# Patient Record
Sex: MALE | Race: WHITE | NOT HISPANIC OR LATINO
[De-identification: names, ages, dates, MRNs, and addresses within clinical notes are randomized per-mention and may not be internally consistent; named-entity substitution may affect disease eponyms.]

---

## 2018-03-05 PROBLEM — Z00.00 ENCOUNTER FOR PREVENTIVE HEALTH EXAMINATION: Status: ACTIVE | Noted: 2018-03-05

## 2018-05-16 ENCOUNTER — APPOINTMENT (OUTPATIENT)
Dept: DERMATOLOGY | Facility: CLINIC | Age: 58
End: 2018-05-16
Payer: COMMERCIAL

## 2018-05-16 PROCEDURE — 99202 OFFICE O/P NEW SF 15 MIN: CPT

## 2018-07-20 ENCOUNTER — APPOINTMENT (OUTPATIENT)
Dept: DERMATOLOGY | Facility: CLINIC | Age: 58
End: 2018-07-20

## 2018-08-19 ENCOUNTER — INPATIENT (INPATIENT)
Facility: HOSPITAL | Age: 58
LOS: 2 days | Discharge: HOME | DRG: 603 | End: 2018-08-22
Attending: FAMILY MEDICINE | Admitting: FAMILY MEDICINE
Payer: COMMERCIAL

## 2018-08-19 VITALS
DIASTOLIC BLOOD PRESSURE: 73 MMHG | HEART RATE: 89 BPM | SYSTOLIC BLOOD PRESSURE: 153 MMHG | OXYGEN SATURATION: 97 % | HEIGHT: 74 IN | WEIGHT: 233.03 LBS | RESPIRATION RATE: 18 BRPM | TEMPERATURE: 100 F

## 2018-08-19 DIAGNOSIS — L03.114 CELLULITIS OF LEFT UPPER LIMB: ICD-10-CM

## 2018-08-19 DIAGNOSIS — Z90.49 ACQUIRED ABSENCE OF OTHER SPECIFIED PARTS OF DIGESTIVE TRACT: Chronic | ICD-10-CM

## 2018-08-19 LAB
ALBUMIN SERPL ELPH-MCNC: 4.6 G/DL — SIGNIFICANT CHANGE UP (ref 3.3–5.2)
ALP SERPL-CCNC: 74 U/L — SIGNIFICANT CHANGE UP (ref 40–120)
ALT FLD-CCNC: 28 U/L — SIGNIFICANT CHANGE UP
ANION GAP SERPL CALC-SCNC: 15 MMOL/L — SIGNIFICANT CHANGE UP (ref 5–17)
APTT BLD: 30.7 SEC — SIGNIFICANT CHANGE UP (ref 27.5–37.4)
AST SERPL-CCNC: 22 U/L — SIGNIFICANT CHANGE UP
BASOPHILS # BLD AUTO: 0 K/UL — SIGNIFICANT CHANGE UP (ref 0–0.2)
BASOPHILS NFR BLD AUTO: 0.2 % — SIGNIFICANT CHANGE UP (ref 0–2)
BILIRUB SERPL-MCNC: 1.5 MG/DL — SIGNIFICANT CHANGE UP (ref 0.4–2)
BUN SERPL-MCNC: 9 MG/DL — SIGNIFICANT CHANGE UP (ref 8–20)
CALCIUM SERPL-MCNC: 9.7 MG/DL — SIGNIFICANT CHANGE UP (ref 8.6–10.2)
CHLORIDE SERPL-SCNC: 99 MMOL/L — SIGNIFICANT CHANGE UP (ref 98–107)
CO2 SERPL-SCNC: 25 MMOL/L — SIGNIFICANT CHANGE UP (ref 22–29)
CREAT SERPL-MCNC: 0.82 MG/DL — SIGNIFICANT CHANGE UP (ref 0.5–1.3)
EOSINOPHIL # BLD AUTO: 0.1 K/UL — SIGNIFICANT CHANGE UP (ref 0–0.5)
EOSINOPHIL NFR BLD AUTO: 1.2 % — SIGNIFICANT CHANGE UP (ref 0–5)
GLUCOSE SERPL-MCNC: 102 MG/DL — SIGNIFICANT CHANGE UP (ref 70–115)
GRAM STN FLD: SIGNIFICANT CHANGE UP
HCT VFR BLD CALC: 45.4 % — SIGNIFICANT CHANGE UP (ref 42–52)
HGB BLD-MCNC: 15.6 G/DL — SIGNIFICANT CHANGE UP (ref 14–18)
INR BLD: 1.23 RATIO — HIGH (ref 0.88–1.16)
LYMPHOCYTES # BLD AUTO: 19.9 % — LOW (ref 20–55)
LYMPHOCYTES # BLD AUTO: 2.2 K/UL — SIGNIFICANT CHANGE UP (ref 1–4.8)
MCHC RBC-ENTMCNC: 30.5 PG — SIGNIFICANT CHANGE UP (ref 27–31)
MCHC RBC-ENTMCNC: 34.4 G/DL — SIGNIFICANT CHANGE UP (ref 32–36)
MCV RBC AUTO: 88.8 FL — SIGNIFICANT CHANGE UP (ref 80–94)
MONOCYTES # BLD AUTO: 0.8 K/UL — SIGNIFICANT CHANGE UP (ref 0–0.8)
MONOCYTES NFR BLD AUTO: 7.4 % — SIGNIFICANT CHANGE UP (ref 3–10)
NEUTROPHILS # BLD AUTO: 7.9 K/UL — SIGNIFICANT CHANGE UP (ref 1.8–8)
NEUTROPHILS NFR BLD AUTO: 71 % — SIGNIFICANT CHANGE UP (ref 37–73)
PLATELET # BLD AUTO: 184 K/UL — SIGNIFICANT CHANGE UP (ref 150–400)
POTASSIUM SERPL-MCNC: 3.6 MMOL/L — SIGNIFICANT CHANGE UP (ref 3.5–5.3)
POTASSIUM SERPL-SCNC: 3.6 MMOL/L — SIGNIFICANT CHANGE UP (ref 3.5–5.3)
PROT SERPL-MCNC: 8 G/DL — SIGNIFICANT CHANGE UP (ref 6.6–8.7)
PROTHROM AB SERPL-ACNC: 13.6 SEC — HIGH (ref 9.8–12.7)
RBC # BLD: 5.11 M/UL — SIGNIFICANT CHANGE UP (ref 4.6–6.2)
RBC # FLD: 13.8 % — SIGNIFICANT CHANGE UP (ref 11–15.6)
SODIUM SERPL-SCNC: 139 MMOL/L — SIGNIFICANT CHANGE UP (ref 135–145)
SPECIMEN SOURCE: SIGNIFICANT CHANGE UP
WBC # BLD: 11.2 K/UL — HIGH (ref 4.8–10.8)
WBC # FLD AUTO: 11.2 K/UL — HIGH (ref 4.8–10.8)

## 2018-08-19 PROCEDURE — 99284 EMERGENCY DEPT VISIT MOD MDM: CPT

## 2018-08-19 PROCEDURE — 71045 X-RAY EXAM CHEST 1 VIEW: CPT | Mod: 26

## 2018-08-19 PROCEDURE — 99223 1ST HOSP IP/OBS HIGH 75: CPT

## 2018-08-19 PROCEDURE — 73130 X-RAY EXAM OF HAND: CPT | Mod: 26,LT

## 2018-08-19 RX ORDER — SACCHAROMYCES BOULARDII 250 MG
250 POWDER IN PACKET (EA) ORAL
Qty: 0 | Refills: 0 | Status: DISCONTINUED | OUTPATIENT
Start: 2018-08-19 | End: 2018-08-22

## 2018-08-19 RX ORDER — PIPERACILLIN AND TAZOBACTAM 4; .5 G/20ML; G/20ML
3.38 INJECTION, POWDER, LYOPHILIZED, FOR SOLUTION INTRAVENOUS EVERY 8 HOURS
Qty: 0 | Refills: 0 | Status: DISCONTINUED | OUTPATIENT
Start: 2018-08-19 | End: 2018-08-20

## 2018-08-19 RX ORDER — IBUPROFEN 200 MG
600 TABLET ORAL EVERY 6 HOURS
Qty: 0 | Refills: 0 | Status: DISCONTINUED | OUTPATIENT
Start: 2018-08-19 | End: 2018-08-22

## 2018-08-19 RX ORDER — PIPERACILLIN AND TAZOBACTAM 4; .5 G/20ML; G/20ML
3.38 INJECTION, POWDER, LYOPHILIZED, FOR SOLUTION INTRAVENOUS ONCE
Qty: 0 | Refills: 0 | Status: COMPLETED | OUTPATIENT
Start: 2018-08-19 | End: 2018-08-19

## 2018-08-19 RX ORDER — VANCOMYCIN HCL 1 G
1500 VIAL (EA) INTRAVENOUS EVERY 12 HOURS
Qty: 0 | Refills: 0 | Status: DISCONTINUED | OUTPATIENT
Start: 2018-08-20 | End: 2018-08-20

## 2018-08-19 RX ORDER — ENOXAPARIN SODIUM 100 MG/ML
40 INJECTION SUBCUTANEOUS DAILY
Qty: 0 | Refills: 0 | Status: DISCONTINUED | OUTPATIENT
Start: 2018-08-19 | End: 2018-08-22

## 2018-08-19 RX ORDER — TETANUS TOXOID, REDUCED DIPHTHERIA TOXOID AND ACELLULAR PERTUSSIS VACCINE, ADSORBED 5; 2.5; 8; 8; 2.5 [IU]/.5ML; [IU]/.5ML; UG/.5ML; UG/.5ML; UG/.5ML
0.5 SUSPENSION INTRAMUSCULAR ONCE
Qty: 0 | Refills: 0 | Status: COMPLETED | OUTPATIENT
Start: 2018-08-19 | End: 2018-08-19

## 2018-08-19 RX ORDER — IPRATROPIUM/ALBUTEROL SULFATE 18-103MCG
3 AEROSOL WITH ADAPTER (GRAM) INHALATION ONCE
Qty: 0 | Refills: 0 | Status: COMPLETED | OUTPATIENT
Start: 2018-08-19 | End: 2018-08-19

## 2018-08-19 RX ORDER — SODIUM CHLORIDE 9 MG/ML
3200 INJECTION INTRAMUSCULAR; INTRAVENOUS; SUBCUTANEOUS ONCE
Qty: 0 | Refills: 0 | Status: COMPLETED | OUTPATIENT
Start: 2018-08-19 | End: 2018-08-19

## 2018-08-19 RX ORDER — IBUPROFEN 200 MG
400 TABLET ORAL EVERY 6 HOURS
Qty: 0 | Refills: 0 | Status: DISCONTINUED | OUTPATIENT
Start: 2018-08-19 | End: 2018-08-22

## 2018-08-19 RX ORDER — ACETAMINOPHEN 500 MG
650 TABLET ORAL EVERY 6 HOURS
Qty: 0 | Refills: 0 | Status: DISCONTINUED | OUTPATIENT
Start: 2018-08-19 | End: 2018-08-22

## 2018-08-19 RX ORDER — IBUPROFEN 200 MG
600 TABLET ORAL ONCE
Qty: 0 | Refills: 0 | Status: COMPLETED | OUTPATIENT
Start: 2018-08-19 | End: 2018-08-19

## 2018-08-19 RX ORDER — VANCOMYCIN HCL 1 G
1000 VIAL (EA) INTRAVENOUS ONCE
Qty: 0 | Refills: 0 | Status: COMPLETED | OUTPATIENT
Start: 2018-08-19 | End: 2018-08-19

## 2018-08-19 RX ADMIN — PIPERACILLIN AND TAZOBACTAM 200 GRAM(S): 4; .5 INJECTION, POWDER, LYOPHILIZED, FOR SOLUTION INTRAVENOUS at 16:53

## 2018-08-19 RX ADMIN — SODIUM CHLORIDE 3200 MILLILITER(S): 9 INJECTION INTRAMUSCULAR; INTRAVENOUS; SUBCUTANEOUS at 16:53

## 2018-08-19 RX ADMIN — Medication 600 MILLIGRAM(S): at 16:55

## 2018-08-19 RX ADMIN — TETANUS TOXOID, REDUCED DIPHTHERIA TOXOID AND ACELLULAR PERTUSSIS VACCINE, ADSORBED 0.5 MILLILITER(S): 5; 2.5; 8; 8; 2.5 SUSPENSION INTRAMUSCULAR at 16:53

## 2018-08-19 RX ADMIN — Medication 3 MILLILITER(S): at 16:53

## 2018-08-19 RX ADMIN — Medication 250 MILLIGRAM(S): at 19:07

## 2018-08-19 RX ADMIN — Medication 3 MILLILITER(S): at 18:25

## 2018-08-19 NOTE — ED STATDOCS - MUSCULOSKELETAL, MLM
Able to move all extremities spontaneously. Able to move all extremities spontaneously. TTP of left 4th digit

## 2018-08-19 NOTE — H&P ADULT - NSHPLABSRESULTS_GEN_ALL_CORE
LABS:                        15.6   11.2  )-----------( 184      ( 19 Aug 2018 17:09 )             45.4     08-19    139  |  99  |  9.0  ----------------------------<  102  3.6   |  25.0  |  0.82    Ca    9.7      19 Aug 2018 17:09    TPro  8.0  /  Alb  4.6  /  TBili  1.5  /  DBili  x   /  AST  22  /  ALT  28  /  AlkPhos  74  08-19    PT/INR - ( 19 Aug 2018 17:09 )   PT: 13.6 sec;   INR: 1.23 ratio         PTT - ( 19 Aug 2018 17:09 )  PTT:30.7 sec

## 2018-08-19 NOTE — ED STATDOCS - SKIN, MLM
skin normal color for race, warm, dry and intact. obvious left 4th finger puncture wound with signs of infection

## 2018-08-19 NOTE — ED STATDOCS - MEDICAL DECISION MAKING DETAILS
59 y/o M s/p scratch from his cat, wheezing and rhonci concern for PNA will order Xray 57 y/o M s/p cat scratch with obvious left 4th finger infection, cellulitis concerning for tendon sheath flexor tenosynovitis, Xray, labs and most likely admit.

## 2018-08-19 NOTE — H&P ADULT - HISTORY OF PRESENT ILLNESS
58 years old male with no known PMH comes with left hand pain and swelling. As per patient, 4 days ago he accidently had puncture wound in his left 4th finger from cat's scratch while sleeping. Next day, he noticed some pain/swelling in his finger. He started using Hydrogen Peroxide and Epsom Salt but the pain and swelling are getting worse. He also has noticed redness around the finger for the last two days. Denies fever but was feeling warm today. No purulent discharge from wound but he expressed some serosanguinous fluid.

## 2018-08-19 NOTE — ED ADULT NURSE NOTE - OBJECTIVE STATEMENT
pt was clawed by cat to left 4th digit on thursday throughout the night,, swelling and redness to left 4th digit, now with streaking present up right forearm.

## 2018-08-19 NOTE — H&P ADULT - NSHPPHYSICALEXAM_GEN_ALL_CORE
Vital Signs Last 24 Hrs  T(C): 38 (19 Aug 2018 15:14), Max: 38 (19 Aug 2018 15:14)  T(F): 100.4 (19 Aug 2018 15:14), Max: 100.4 (19 Aug 2018 15:14)  HR: 60 (19 Aug 2018 19:07) (60 - 89)  BP: 116/63 (19 Aug 2018 19:07) (116/63 - 153/73)  RR: 18 (19 Aug 2018 15:14) (18 - 18)  SpO2: 97% (19 Aug 2018 15:14) (97% - 97%)  General: Well developed. Well nourished. No acute distress  HEENT: PERRLA. EOMI. Clear conjunctivae. Moist mucus membrane  Neck: Supple. No JVD. No Thyromegaly   Chest: CTA bilaterally - no wheezing, rales or rhonchi.   Heart: Normal S1 & S2 with RRR. No murmur.   Abdomen: Soft. Non-tender. Non-distended. + BS  Ext: No pedal edema. No calf tenderness. Left Hand: Erythema and swelling around 4th finger extending towards wrist - area marked. 2 puncture wounds in 4th finger - clean. Limited ROM.  Neuro: AAO x 3. No focal deficit. CN II-XII grossly WNL.  No speech disorder.  Skin: Warm and Dry  Psychiatry: Normal mood and affect

## 2018-08-19 NOTE — ED ADULT NURSE NOTE - NSIMPLEMENTINTERV_GEN_ALL_ED
Implemented All Universal Safety Interventions:  [place] to call system. Call bell, personal items and telephone within reach. Instruct patient to call for assistance. Room bathroom lighting operational. Non-slip footwear when patient is off stretcher. Physically safe environment: no spills, clutter or unnecessary equipment. Stretcher in lowest position, wheels locked, appropriate side rails in place.

## 2018-08-19 NOTE — ED STATDOCS - ATTENDING CONTRIBUTION TO CARE
The patient seen and examined.  The patient presents with streaking cellulitis after car scratch.  Cellulitis    I, Juvenal Early, performed the initial face to face bedside interview with this patient regarding history of present illness, review of symptoms and relevant past medical, social and family history.  I completed an independent physical examination.  I was the initial provider who evaluated this patient. I have signed out the follow up of any pending tests (i.e. labs, radiological studies) to the ACP.  I have communicated the patient’s plan of care and disposition with the ACP.  The history, relevant review of systems, past medical and surgical history, medical decision making, and physical examination was documented by the scribe in my presence and I attest to the accuracy of the documentation.

## 2018-08-19 NOTE — H&P ADULT - FAMILY HISTORY
Father  Still living? Unknown  Family history of hypertension, Age at diagnosis: Age Unknown  Family history of lung cancer, Age at diagnosis: Age Unknown

## 2018-08-19 NOTE — CONSULT NOTE ADULT - SUBJECTIVE AND OBJECTIVE BOX
Pt Name: KRISHNA HENRY    MRN: [id number]      Patient is a 58y Male presenting to the emergency department with a chief complaint of left 4th finger pain, swelling and decreased range of motion over the last several days after being scratched by his cat. He stated he has been soaking his finger in epsom salts and expressed some serosanguinous fluid from the abrasion. Denies any other accident of injuries.     HEALTH ISSUES - PROBLEM Dx:      .      REVIEW OF SYSTEMS      General:	comfortable , no acute distress    Skin/Breast: Left hand 4 th finger cellulitis travelling up forearm , erythema noted   	  Respiratory and Thorax: Currently having nebulizer treatment for wheezing and pneumonia  	  Musculoskeletal:  	Left hand: 4th digit cellulitis with limited range of motion secondary to swelling and pain    Allergic/Immunologic:	none     ROS is otherwise negative.    PAST MEDICAL & SURGICAL HISTORY:  PAST MEDICAL & SURGICAL HISTORY:  Pneumonia  History of appendectomy      Allergies: No Known Allergies      Medications: ALBUTerol/ipratropium for Nebulization. 3 milliLiter(s) Nebulizer once  vancomycin  IVPB 1000 milliGRAM(s) IV Intermittent once      FAMILY HISTORY:  : non-contributory    Social History:     Ambulation: Walking independently                           15.6   11.2  )-----------( 184      ( 19 Aug 2018 17:09 )             45.4     08-19    139  |  99  |  9.0  ----------------------------<  102  3.6   |  25.0  |  0.82    Ca    9.7      19 Aug 2018 17:09    TPro  8.0  /  Alb  4.6  /  TBili  1.5  /  DBili  x   /  AST  22  /  ALT  28  /  AlkPhos  74  08-19      PHYSICAL EXAM:    Vital Signs Last 24 Hrs  T(C): 38 (19 Aug 2018 15:14), Max: 38 (19 Aug 2018 15:14)  T(F): 100.4 (19 Aug 2018 15:14), Max: 100.4 (19 Aug 2018 15:14)  HR: 89 (19 Aug 2018 15:14) (89 - 89)  BP: 153/73 (19 Aug 2018 15:14) (153/73 - 153/73)  BP(mean): --  RR: 18 (19 Aug 2018 15:14) (18 - 18)  SpO2: 97% (19 Aug 2018 15:14) (97% - 97%)  Daily Height in cm: 187.96 (19 Aug 2018 15:14)    Daily     Appearance: Alert, responsive, in no acute distress.    Neurological: Sensation is grossly intact to light touch. limited  motor function secondary to pain and swelling.   No focal deficits or weaknesses found.    Skin: cellulitis travelling up forearm dorsal aspect. 2 small areas of skin abrasion noted on ulnar and radial aspect of4 th finger Vascular: 2+ distal pulses. Cap refill < 2 sec. No signs of venous insuffiencey or stasis. No extremity ulcerations. No cyanosis.    Musculoskeletal:         Left Upper Extremity: pain and swelling 4 th finger LUE secondary to a cat scratch several days ago. Limited range of motion of 4 th finger secondary to pain and swelling.  Cellulitis noted at dorsal aspect of forearm  and into the 4th  webspace: There is erythema of the affected area.. There is mild tenderness of the affected area. No fluctuance. No drainage or discharge  Compartments are soft. Sensation to light touch is grossly intact without focal deficit and is symmetric bilaterally. Motion is mildly limited as a result of pain. 2+ radial pulse. Capillary refill is less than 2 seconds. No cyanosis..    Primary Orthopedic Assessment:  • hand cellulitis 4th finger         Right Upper Extremity:       Left Lower Extremity:       Right Lower Extremity:    Imaging Studies:  X rays pending, Have not been obtained yet     A/P:  Discussed with Dr Tellez. He will be treated non operatively with warm soaks and IV antibiotics  Infectious disease consult  Admiit to medicine for IVABX    T(C): 38 (19 Aug 2018 15:14), Max: 38 (19 Aug 2018 15:14)  T(F): 100.4 (19 Aug 2018 15:14), Max: 100.4 (19 Aug 2018 15:14)  HR: 89 (19 Aug 2018 15:14) (89 - 89)  BP: 153/73 (19 Aug 2018 15:14) (153/73 - 153/73)  BP(mean): --  RR: 18 (19 Aug 2018 15:14) (18 - 18)  SpO2: 97% (19 Aug 2018 15:14) (97% - 97%)                          15.6   11.2  )-----------( 184      ( 19 Aug 2018 17:09 )             45.4     08-19    139  |  99  |  9.0  ----------------------------<  102  3.6   |  25.0  |  0.82    Ca    9.7      19 Aug 2018 17:09    TPro  8.0  /  Alb  4.6  /  TBili  1.5  /  DBili  x   /  AST  22  /  ALT  28  /  AlkPhos  74  08-19      MEDICATIONS  (STANDING):  ALBUTerol/ipratropium for Nebulization. 3 milliLiter(s) Nebulizer once  vancomycin  IVPB 1000 milliGRAM(s) IV Intermittent once Pt Name: KRISHNA HENRY    MRN: [id number]      Patient is a 58y Right hand dominant (also ambidextrous ) Male presenting to the emergency department with a chief complaint of left 4th finger pain, swelling and decreased range of motion over the last several days after being scratched by his cat. He stated he has been soaking his finger in epsom salts and expressed some serosanguinous fluid from the abrasion. Denies any other accident of injuries. He has not seen anyone else at this time and not taken any antibiotics.     REVIEW OF SYSTEMS      General:	comfortable , no acute distress    Skin/Breast: Left hand 4 th finger cellulitis travelling up forearm , erythema noted   	  Respiratory and Thorax: Currently having nebulizer treatment for wheezing and pneumonia  	  Musculoskeletal:  	Left hand: 4th digit cellulitis with limited range of motion secondary to swelling and pain    Allergic/Immunologic:	none     ROS is otherwise negative.    PAST MEDICAL & SURGICAL HISTORY:  PAST MEDICAL & SURGICAL HISTORY:  Pneumonia - currently  History of appendectomy  Right long finger surgery  undescended testicle surgery    Allergies: No Known Allergies      Medications: ALBUTerol/ipratropium for Nebulization. 3 milliLiter(s) Nebulizer once  vancomycin  IVPB 1000 milliGRAM(s) IV Intermittent once      FAMILY HISTORY:  : non-contributory    Social History: nonsmoker    Ambulation: Walking independently                           15.6   11.2  )-----------( 184      ( 19 Aug 2018 17:09 )             45.4     08-19    139  |  99  |  9.0  ----------------------------<  102  3.6   |  25.0  |  0.82    Ca    9.7      19 Aug 2018 17:09    TPro  8.0  /  Alb  4.6  /  TBili  1.5  /  DBili  x   /  AST  22  /  ALT  28  /  AlkPhos  74  08-19      PHYSICAL EXAM:    Vital Signs Last 24 Hrs  T(C): 38 (19 Aug 2018 15:14), Max: 38 (19 Aug 2018 15:14)  T(F): 100.4 (19 Aug 2018 15:14), Max: 100.4 (19 Aug 2018 15:14)  HR: 89 (19 Aug 2018 15:14) (89 - 89)  BP: 153/73 (19 Aug 2018 15:14) (153/73 - 153/73)  BP(mean): --  RR: 18 (19 Aug 2018 15:14) (18 - 18)  SpO2: 97% (19 Aug 2018 15:14) (97% - 97%)  Daily Height in cm: 187.96 (19 Aug 2018 15:14)    Daily     Appearance: Alert, responsive, in no acute distress.    Neurological: Sensation is grossly intact to light touch. limited  motor function secondary to pain and swelling.   No focal deficits or weaknesses found.    Skin: cellulitis travelling up forearm dorsal aspect. 2 small areas of skin abrasion noted on ulnar and radial aspect of4 th finger Vascular: 2+ distal pulses. Cap refill < 2 sec. No signs of venous insuffiencey or stasis. No extremity ulcerations. No cyanosis.    Musculoskeletal:         Left Upper Extremity: pain and swelling 4 th finger LUE secondary to a cat scratch several days ago. Limited range of motion of 4 th finger secondary to pain and swelling.  Cellulitis noted at dorsal aspect of forearm and into the 4th  webspace: There is erythema of the affected area.. There is mild tenderness of the affected area. No fluctuance. No drainage or discharge. small superficial skin clearing radial side of ring finger - unroofed and culture taken. Compartments are soft. Sensation to light touch is grossly intact without focal deficit and is symmetric bilaterally. Motion is mildly limited as a result of pain. 2+ radial pulse. Capillary refill is less than 2 seconds. No cyanosis.. No flexor sheath kanavel signs present;    Primary Orthopedic Assessment:  • hand cellulitis/lumphangitis 4th finger secondary to cat scratch         Right Upper Extremity: FROM, no tenderness, no swelling, NV intact      Imaging Studies:  X rays my interpretation (Renea Tellez MD ) left hand - soft tissue swelling ring finger    A/P:  Cellulitis/lymphangitis left ring finger/hand     -Patient seen with Dr Tellez.   - Infectious disease consult  - Admiit to medicine for IVABX  -elevation  -warm compresses  -warm soaks  -no need for I&D at this time

## 2018-08-19 NOTE — ED STATDOCS - PROGRESS NOTE DETAILS
JOSE M GONCALVES: PT evaluated by intake physician. HPI/PE/ROS as noted above. Will follow up plan per intake physician HAND CALLED FOR CONSULT

## 2018-08-19 NOTE — ED STATDOCS - OBJECTIVE STATEMENT
59 y/o M pt with hx of PNA presents to ED c/o of a cat scratch/puncture with its claws on his left hand after rolling over her while asleep on Thursday night. Subjective temperature of over 100 degrees F and increase in swelling of the L hand from the puncture and erythema radiating toward the wrist. 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 Pt has been soaking scratch in hydrogen pre oxide and epsin salts.  Associated cough and wheezing. Last tetanus shot > 7 years ago. Denies N/V/D, chills, SOB, CP, difficulty breathing, HA, blurry vision or abd pain. 57 y/o M pt with hx of PNA presents to ED c/o of a cat scratch/puncture with its claws on his left 4th digit after rolling over her while asleep on Thursday night. Subjective temperature of over 100 degrees F and increase in swelling of the L hand from the puncture and erythema radiating toward the wrist. 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 Pt has been soaking scratch in hydrogen pre oxide and epsom salt. Associated cough and wheezing. Last tetanus shot > 7 years ago. Denies N/V/D, chills, SOB, CP, difficulty breathing, HA, blurry vision or abd pain.

## 2018-08-19 NOTE — H&P ADULT - ASSESSMENT
58 years old male with no known PMH comes with left hand pain and swelling. As per patient, 4 days ago he accidently had puncture wound in his left 4th finger from cat's scratch while sleeping. Next day, he noticed some pain/swelling in his finger. He started using Hydrogen Peroxide and Epsom Salt but the pain and swelling are getting worse. He also has noticed redness around the finger for the last two days. Denies fever but was feeling warm today. No purulent discharge from wound but he expressed some serosanguinous fluid. 58 years old male with no known PMH comes with left hand pain and swelling. As per patient, 4 days ago he accidently had puncture wound in his left 4th finger from cat's scratch while sleeping. Next day, he noticed some pain/swelling in his finger. He started using Hydrogen Peroxide and Epsom Salt but the pain and swelling are getting worse. He also has noticed redness around the finger for the last two days. Denies fever but was feeling warm today. No purulent discharge from wound but he expressed some serosanguinous fluid.      -- Left Hand Cellulitis  - Cultures sent  - ADAcel given  - Ortho Consult appreciated  - Continue Vanco and Zosyn  - Warm compresses  - ID Consult  DVT Prophylaxis -- Lovenox 40 mg

## 2018-08-20 DIAGNOSIS — L03.114 CELLULITIS OF LEFT UPPER LIMB: ICD-10-CM

## 2018-08-20 LAB
-  CANDIDA ALBICANS: SIGNIFICANT CHANGE UP
-  CANDIDA GLABRATA: SIGNIFICANT CHANGE UP
-  CANDIDA KRUSEI: SIGNIFICANT CHANGE UP
-  CANDIDA PARAPSILOSIS: SIGNIFICANT CHANGE UP
-  CANDIDA TROPICALIS: SIGNIFICANT CHANGE UP
-  COAGULASE NEGATIVE STAPHYLOCOCCUS: SIGNIFICANT CHANGE UP
-  K. PNEUMONIAE GROUP: SIGNIFICANT CHANGE UP
-  KPC RESISTANCE GENE: SIGNIFICANT CHANGE UP
-  STREPTOCOCCUS SP. (NOT GRP A, B OR S PNEUMONIAE): SIGNIFICANT CHANGE UP
A BAUMANNII DNA SPEC QL NAA+PROBE: SIGNIFICANT CHANGE UP
ANION GAP SERPL CALC-SCNC: 14 MMOL/L — SIGNIFICANT CHANGE UP (ref 5–17)
APPEARANCE UR: CLEAR — SIGNIFICANT CHANGE UP
BASOPHILS # BLD AUTO: 0 K/UL — SIGNIFICANT CHANGE UP (ref 0–0.2)
BASOPHILS NFR BLD AUTO: 0.2 % — SIGNIFICANT CHANGE UP (ref 0–2)
BILIRUB UR-MCNC: NEGATIVE — SIGNIFICANT CHANGE UP
BUN SERPL-MCNC: 9 MG/DL — SIGNIFICANT CHANGE UP (ref 8–20)
CALCIUM SERPL-MCNC: 8.8 MG/DL — SIGNIFICANT CHANGE UP (ref 8.6–10.2)
CHLORIDE SERPL-SCNC: 100 MMOL/L — SIGNIFICANT CHANGE UP (ref 98–107)
CO2 SERPL-SCNC: 23 MMOL/L — SIGNIFICANT CHANGE UP (ref 22–29)
COLOR SPEC: YELLOW — SIGNIFICANT CHANGE UP
CREAT SERPL-MCNC: 0.8 MG/DL — SIGNIFICANT CHANGE UP (ref 0.5–1.3)
DIFF PNL FLD: NEGATIVE — SIGNIFICANT CHANGE UP
E CLOAC COMP DNA BLD POS QL NAA+PROBE: SIGNIFICANT CHANGE UP
E COLI DNA BLD POS QL NAA+NON-PROBE: SIGNIFICANT CHANGE UP
ENTEROCOC DNA BLD POS QL NAA+NON-PROBE: SIGNIFICANT CHANGE UP
ENTEROCOC DNA BLD POS QL NAA+NON-PROBE: SIGNIFICANT CHANGE UP
EOSINOPHIL # BLD AUTO: 0.1 K/UL — SIGNIFICANT CHANGE UP (ref 0–0.5)
EOSINOPHIL NFR BLD AUTO: 1.2 % — SIGNIFICANT CHANGE UP (ref 0–5)
GLUCOSE SERPL-MCNC: 144 MG/DL — HIGH (ref 70–115)
GLUCOSE UR QL: NEGATIVE MG/DL — SIGNIFICANT CHANGE UP
GP B STREP DNA BLD POS QL NAA+NON-PROBE: SIGNIFICANT CHANGE UP
HAEM INFLU DNA BLD POS QL NAA+NON-PROBE: SIGNIFICANT CHANGE UP
HCT VFR BLD CALC: 40.8 % — LOW (ref 42–52)
HGB BLD-MCNC: 13.7 G/DL — LOW (ref 14–18)
K OXYTOCA DNA BLD POS QL NAA+NON-PROBE: SIGNIFICANT CHANGE UP
KETONES UR-MCNC: NEGATIVE — SIGNIFICANT CHANGE UP
L MONOCYTOG DNA BLD POS QL NAA+NON-PROBE: SIGNIFICANT CHANGE UP
LEUKOCYTE ESTERASE UR-ACNC: NEGATIVE — SIGNIFICANT CHANGE UP
LYMPHOCYTES # BLD AUTO: 2.4 K/UL — SIGNIFICANT CHANGE UP (ref 1–4.8)
LYMPHOCYTES # BLD AUTO: 22.5 % — SIGNIFICANT CHANGE UP (ref 20–55)
MAGNESIUM SERPL-MCNC: 1.9 MG/DL — SIGNIFICANT CHANGE UP (ref 1.6–2.6)
MCHC RBC-ENTMCNC: 29.9 PG — SIGNIFICANT CHANGE UP (ref 27–31)
MCHC RBC-ENTMCNC: 33.6 G/DL — SIGNIFICANT CHANGE UP (ref 32–36)
MCV RBC AUTO: 89.1 FL — SIGNIFICANT CHANGE UP (ref 80–94)
METHOD TYPE: SIGNIFICANT CHANGE UP
MONOCYTES # BLD AUTO: 0.9 K/UL — HIGH (ref 0–0.8)
MONOCYTES NFR BLD AUTO: 8 % — SIGNIFICANT CHANGE UP (ref 3–10)
MRSA SPEC QL CULT: SIGNIFICANT CHANGE UP
MSSA DNA SPEC QL NAA+PROBE: SIGNIFICANT CHANGE UP
N MEN ISLT CULT: SIGNIFICANT CHANGE UP
NEUTROPHILS # BLD AUTO: 7.2 K/UL — SIGNIFICANT CHANGE UP (ref 1.8–8)
NEUTROPHILS NFR BLD AUTO: 67.8 % — SIGNIFICANT CHANGE UP (ref 37–73)
NITRITE UR-MCNC: NEGATIVE — SIGNIFICANT CHANGE UP
P AERUGINOSA DNA BLD POS NAA+NON-PROBE: SIGNIFICANT CHANGE UP
PH UR: 6.5 — SIGNIFICANT CHANGE UP (ref 5–8)
PLATELET # BLD AUTO: 164 K/UL — SIGNIFICANT CHANGE UP (ref 150–400)
POTASSIUM SERPL-MCNC: 3.5 MMOL/L — SIGNIFICANT CHANGE UP (ref 3.5–5.3)
POTASSIUM SERPL-SCNC: 3.5 MMOL/L — SIGNIFICANT CHANGE UP (ref 3.5–5.3)
PROT UR-MCNC: NEGATIVE MG/DL — SIGNIFICANT CHANGE UP
RBC # BLD: 4.58 M/UL — LOW (ref 4.6–6.2)
RBC # FLD: 14 % — SIGNIFICANT CHANGE UP (ref 11–15.6)
S MARCESCENS DNA BLD POS NAA+NON-PROBE: SIGNIFICANT CHANGE UP
S PNEUM DNA BLD POS QL NAA+NON-PROBE: SIGNIFICANT CHANGE UP
S PYO DNA BLD POS QL NAA+NON-PROBE: SIGNIFICANT CHANGE UP
SODIUM SERPL-SCNC: 137 MMOL/L — SIGNIFICANT CHANGE UP (ref 135–145)
SP GR SPEC: 1.01 — SIGNIFICANT CHANGE UP (ref 1.01–1.02)
UROBILINOGEN FLD QL: 1 MG/DL
WBC # BLD: 10.7 K/UL — SIGNIFICANT CHANGE UP (ref 4.8–10.8)
WBC # FLD AUTO: 10.7 K/UL — SIGNIFICANT CHANGE UP (ref 4.8–10.8)

## 2018-08-20 PROCEDURE — 93010 ELECTROCARDIOGRAM REPORT: CPT

## 2018-08-20 PROCEDURE — 99223 1ST HOSP IP/OBS HIGH 75: CPT

## 2018-08-20 PROCEDURE — 99233 SBSQ HOSP IP/OBS HIGH 50: CPT | Mod: GC

## 2018-08-20 RX ORDER — AMPICILLIN SODIUM AND SULBACTAM SODIUM 250; 125 MG/ML; MG/ML
INJECTION, POWDER, FOR SUSPENSION INTRAMUSCULAR; INTRAVENOUS
Qty: 0 | Refills: 0 | Status: DISCONTINUED | OUTPATIENT
Start: 2018-08-20 | End: 2018-08-22

## 2018-08-20 RX ORDER — AMPICILLIN SODIUM AND SULBACTAM SODIUM 250; 125 MG/ML; MG/ML
3 INJECTION, POWDER, FOR SUSPENSION INTRAMUSCULAR; INTRAVENOUS ONCE
Qty: 0 | Refills: 0 | Status: COMPLETED | OUTPATIENT
Start: 2018-08-20 | End: 2018-08-20

## 2018-08-20 RX ORDER — VANCOMYCIN HCL 1 G
1500 VIAL (EA) INTRAVENOUS EVERY 8 HOURS
Qty: 0 | Refills: 0 | Status: DISCONTINUED | OUTPATIENT
Start: 2018-08-20 | End: 2018-08-20

## 2018-08-20 RX ORDER — AMPICILLIN SODIUM AND SULBACTAM SODIUM 250; 125 MG/ML; MG/ML
3 INJECTION, POWDER, FOR SUSPENSION INTRAMUSCULAR; INTRAVENOUS EVERY 6 HOURS
Qty: 0 | Refills: 0 | Status: DISCONTINUED | OUTPATIENT
Start: 2018-08-20 | End: 2018-08-22

## 2018-08-20 RX ADMIN — Medication 1 TABLET(S): at 09:15

## 2018-08-20 RX ADMIN — Medication 600 MILLIGRAM(S): at 16:05

## 2018-08-20 RX ADMIN — Medication 1 TABLET(S): at 12:01

## 2018-08-20 RX ADMIN — AMPICILLIN SODIUM AND SULBACTAM SODIUM 200 GRAM(S): 250; 125 INJECTION, POWDER, FOR SUSPENSION INTRAMUSCULAR; INTRAVENOUS at 17:19

## 2018-08-20 RX ADMIN — Medication 1 TABLET(S): at 08:45

## 2018-08-20 RX ADMIN — Medication 250 MILLIGRAM(S): at 17:19

## 2018-08-20 RX ADMIN — Medication 1 TABLET(S): at 16:35

## 2018-08-20 RX ADMIN — Medication 300 MILLIGRAM(S): at 05:11

## 2018-08-20 RX ADMIN — ENOXAPARIN SODIUM 40 MILLIGRAM(S): 100 INJECTION SUBCUTANEOUS at 12:01

## 2018-08-20 RX ADMIN — Medication 1 TABLET(S): at 16:05

## 2018-08-20 RX ADMIN — PIPERACILLIN AND TAZOBACTAM 25 GRAM(S): 4; .5 INJECTION, POWDER, LYOPHILIZED, FOR SOLUTION INTRAVENOUS at 09:00

## 2018-08-20 RX ADMIN — Medication 600 MILLIGRAM(S): at 16:35

## 2018-08-20 RX ADMIN — AMPICILLIN SODIUM AND SULBACTAM SODIUM 200 GRAM(S): 250; 125 INJECTION, POWDER, FOR SUSPENSION INTRAMUSCULAR; INTRAVENOUS at 12:01

## 2018-08-20 RX ADMIN — PIPERACILLIN AND TAZOBACTAM 25 GRAM(S): 4; .5 INJECTION, POWDER, LYOPHILIZED, FOR SOLUTION INTRAVENOUS at 01:14

## 2018-08-20 NOTE — PROGRESS NOTE ADULT - PROBLEM SELECTOR PLAN 1
due to cat bite - requires Pasturella multiocida coverage - agreed with change to IV unasyn and if clinically improves will change to po augmentin.  + Bacteremia with GNR - Pending I%S  - pain control

## 2018-08-20 NOTE — PROGRESS NOTE ADULT - SUBJECTIVE AND OBJECTIVE BOX
CC: Left hand pain and swelling (19 Aug 2018 19:24)    HPI: 58 years old male with no known PMH with left hand pain and swelling due to recent cat bite - started to improve with IV abx.     INTERVAL HPI/OVERNIGHT EVENTS: decreased edema and pain, + HA, + chest congestion and dry cough for 1 week  Other ROS reviewed and neg     Vital Signs Last 24 Hrs  T(C): 37.4 (20 Aug 2018 12:32), Max: 37.4 (20 Aug 2018 12:32)  T(F): 99.4 (20 Aug 2018 12:32), Max: 99.4 (20 Aug 2018 12:32)  HR: 78 (20 Aug 2018 12:32) (60 - 78)  BP: 117/77 (20 Aug 2018 12:32) (116/63 - 122/79)  RR: 18 (20 Aug 2018 12:32) (16 - 18)  SpO2: 96% (20 Aug 2018 12:32) (96% - 97%)  I&O's Detail    20 Aug 2018 07:01  -  20 Aug 2018 15:38  --------------------------------------------------------  IN:    IV PiggyBack: 200 mL  Total IN: 200 mL    OUT:  Total OUT: 0 mL    Total NET: 200 mL                            13.7   10.7  )-----------( 164      ( 20 Aug 2018 06:47 )             40.8     20 Aug 2018 06:47    137    |  100    |  9.0    ----------------------------<  144    3.5     |  23.0   |  0.80     Ca    8.8        20 Aug 2018 06:47  Mg     1.9       20 Aug 2018 06:47    TPro  8.0    /  Alb  4.6    /  TBili  1.5    /  DBili  x      /  AST  22     /  ALT  28     /  AlkPhos  74     19 Aug 2018 17:09    PT/INR - ( 19 Aug 2018 17:09 )   PT: 13.6 sec;   INR: 1.23 ratio         PTT - ( 19 Aug 2018 17:09 )  PTT:30.7 sec    LIVER FUNCTIONS - ( 19 Aug 2018 17:09 )  Alb: 4.6 g/dL / Pro: 8.0 g/dL / ALK PHOS: 74 U/L / ALT: 28 U/L / AST: 22 U/L / GGT: x           Urinalysis Basic - ( 20 Aug 2018 03:06 )    Color: Yellow / Appearance: Clear / S.015 / pH: x  Gluc: x / Ketone: Negative  / Bili: Negative / Urobili: 1 mg/dL   Blood: x / Protein: Negative mg/dL / Nitrite: Negative   Leuk Esterase: Negative / RBC: x / WBC x   Sq Epi: x / Non Sq Epi: x / Bacteria: x    MEDICATIONS  (STANDING):  ampicillin/sulbactam  IVPB      ampicillin/sulbactam  IVPB 3 Gram(s) IV Intermittent every 6 hours  enoxaparin Injectable 40 milliGRAM(s) SubCutaneous daily  multivitamin 1 Tablet(s) Oral daily  saccharomyces boulardii 250 milliGRAM(s) Oral two times a day    MEDICATIONS  (PRN):  acetaminophen   Tablet 650 milliGRAM(s) Oral every 6 hours PRN For Temp greater than 38 C (100.4 F)  acetaminophen   Tablet. 650 milliGRAM(s) Oral every 6 hours PRN Mild Pain (1 - 3)  acetaminophen 325 mG/butalbital 50 mG/caffeine 40 mG 1 Tablet(s) Oral every 4 hours PRN headache  ibuprofen  Tablet 400 milliGRAM(s) Oral every 6 hours PRN Moderate Pain  ibuprofen  Tablet 600 milliGRAM(s) Oral every 6 hours PRN Severe Pain    RADIOLOGY & ADDITIONAL TESTS: personally visualized    PHYSICAL EXAM:    General: Well developed; well nourished; in no acute distress  Eyes: PERRLA, EOMI; conjunctiva and sclera clear  Head: Normocephalic; atraumatic  ENMT: No nasal discharge; airway clear  Neck: Supple; non tender; no masses  Respiratory: No wheezes, rales or rhonchi  Cardiovascular: Regular rate and rhythm. S1 and S2   Gastrointestinal: Soft non-tender non-distended; Normal bowel sounds  Genitourinary: No costovertebral angle tenderness  Extremities: Normal range of motion, No clubbing, cyanosis or edema  Vascular: Peripheral pulses palpable 2+ bilaterally  Neurological: Alert and oriented x4  Skin: Warm and dry. left UE demarcated area of erythema and edema involving middle finger with signs of bite, no striking  Musculoskeletal: Normal gait, tone, without deformities  Psychiatric: Cooperative and appropriate

## 2018-08-20 NOTE — PROGRESS NOTE ADULT - ASSESSMENT
58 years old male with no known PMH with left hand pain and swelling due to recent cat bite - started to improve with IV abx

## 2018-08-20 NOTE — PROGRESS NOTE ADULT - SUBJECTIVE AND OBJECTIVE BOX
PA - Note    Patient being followed for a Left 4th finger cellulitis  Patient was found laying on stretcher in the ED.  Presently, he is comfortable and his pain is controlled.  States that he is felling a little better this morning.  No further complaints    Vital Signs Last 24 Hrs  T(C): 37.4 (20 Aug 2018 12:32), Max: 38 (19 Aug 2018 15:14)  T(F): 99.4 (20 Aug 2018 12:32), Max: 100.4 (19 Aug 2018 15:14)  HR: 78 (20 Aug 2018 12:32) (60 - 89)  BP: 117/77 (20 Aug 2018 12:32) (116/63 - 153/73)  BP(mean): --  RR: 18 (20 Aug 2018 12:32) (16 - 18)  SpO2: 96% (20 Aug 2018 12:32) (96% - 97%)    Left Upper Extremity:  Blister noted to side of 4th finger, no drainage noted  Able to ROM finger with some difficulty because of swelling but states it is better than it has been  + ROM of wrist  Redness noted   Brisk capillary Refill  + Sensation    A/P: Left Finger Cellulitis  - Encourage elevation  - IV antibiotics per ID recommendations  - ID note appreciated  - Continue care per medical team  - Pain medication as needed for pain control

## 2018-08-20 NOTE — CONSULT NOTE ADULT - SUBJECTIVE AND OBJECTIVE BOX
[facility] [place] MANJINDER IBANEZ MD FACP   ZENON SOTOMAYOR MD  Diplomates American Board of Internal Medicine and Infecctious Diseases  [phone]  [phone] MERON [id number]      HPI:  58 years old male with no known PMH comes with left hand pain and swelling. As per patient, 4 days ago he accidently had puncture wound in his left 4th finger from cat's scratch while sleeping. Next day, he noticed some pain/swelling in his finger. He started using Hydrogen Peroxide and Epsom Salt but the pain and swelling are getting worse. He also has noticed redness around the finger for the last two days. Denies fever but was feeling warm today. No purulent discharge from wound but he expressed some serosanguinous fluid.   PT WITH BLOOD CX GM NEG LUISITO  ASKED TO EVALUATE FROM ID STANDPOINT       PAST MEDICAL & SURGICAL HISTORY:  Pneumonia  History of appendectomy      ANTIBIOTICS  ampicillin/sulbactam  IVPB          Allergies    No Known Allergies    Intolerances        SOCIAL HISTORY:       FAMILY HX   FAMILY HISTORY:  Family history of lung cancer (Father)  Family history of hypertension (Father)      Vital Signs Last 24 Hrs  T(C): 36.7 (20 Aug 2018 08:38), Max: 38 (19 Aug 2018 15:14)  T(F): 98.1 (20 Aug 2018 08:38), Max: 100.4 (19 Aug 2018 15:14)  HR: 76 (20 Aug 2018 08:38) (60 - 89)  BP: 122/79 (20 Aug 2018 08:38) (116/63 - 153/73)  BP(mean): --  RR: 18 (20 Aug 2018 08:38) (16 - 18)  SpO2: 97% (20 Aug 2018 08:38) (97% - 97%)  Drug Dosing Weight  Height (cm): 187.96 (19 Aug 2018 15:14)  Weight (kg): 105.7 (19 Aug 2018 15:14)  BMI (kg/m2): 29.9 (19 Aug 2018 15:14)  BSA (m2): 2.32 (19 Aug 2018 15:14)      REVIEW OF SYSTEMS:    CONSTITUTIONAL:  As per HPI.    HEENT:  Eyes:  No diplopia or blurred vision. ENT:  No earache, sore throat or runny nose.    CARDIOVASCULAR:  No pressure, squeezing, strangling, tightness, heaviness or aching about the chest, neck, axilla or epigastrium.    RESPIRATORY:  No cough, shortness of breath, PND or orthopnea.    GASTROINTESTINAL:  No nausea, vomiting or diarrhea.    GENITOURINARY:  No dysuria, frequency or urgency.    MUSCULOSKELETAL:  As per HPI.    SKIN:  LEFT HAND SWELLING    NEUROLOGIC:  No paresthesias, fasciculations, seizures or weakness.                  PHYSICAL EXAMINATION:    GENERAL: The patient is a well-developed, well-nourished ____IN NAD  VITAL SIGNS: T(C): 36.7 (18 @ 08:38), Max: 38 (18 @ 15:14)  HR: 76 (18 @ 08:38) (60 - 89)  BP: 122/79 (18 @ 08:38) (116/63 - 153/73)  RR: 18 (18 @ 08:38) (16 - 18)  SpO2: 97% (18 @ 08:38) (97% - 97%)  Wt(kg): --    HEENT: Head is normocephalic and atraumatic.  ANICTERIC  NECK: Supple. No carotid bruits.  No lymphadenopathy or thyromegaly.    LUNGS: OARSE BREATH SOUNDS    HEART: Regular rate and rhythm without murmur.    ABDOMEN: Soft, nontender, and nondistended.  Positive bowel sounds.  No hepatosplenomegaly was noted. NO REBOUND NO GUARDING    EXTREMITIES: LEFT HAND WITH EDEMA AND ERYTHEMA SMALL BLISTER AREA ON FINGER NO DRAINAGE NO CREPITUS FELT  NO AXILLARY LN PALPATED    NEUROLOGIC: NON FOCAL      SKIN: No ulceration or induration present. NO RASH        BLOOD CULTURES  Culture Results:   Growth in aerobic bottle: Gram Negative Rods  Aerobic Bottle: 11.54 Hours to positivity  Anaerobic Bottle: No growth to date  .  ***Blood Panel PCR results on this specimen are available  approximately 3 hours after the Gram stain result.***  Gram stain, PCR, and/or culture results may not always  correspond due to difference in methodologies.  ************************************************************  This PCR assay was performed using [employer].  The following targets are tested for: Enterococcus,  vancomycin resistant enterococci, Listeria monocytogenes,  coagulase negative staphylococci, S. aureus,  methicillin resistant S. aureus, Streptococcus agalactiae  (Group B), S. pneumoniae, S. pyogenes (Group A),  Acinetobacter baumannii, Enterobacter cloacae, E. coli,  Klebsiella oxytoca, K. pneumoniae, Proteus sp.,  Serratia marcescens, Haemophilus influenzae,  Neisseria meningitidis, Pseudomonas aeruginosa, Candida  albicans, C. glabrata, C krusei, C parapsilosis,  C. tropicalis and the KPC resistance gene.  "Due to technical problems, Proteus sp. will Not be reported as part of  the BCID panel until further notice"  .  TYPE: (C=Critical, N=Notification, A=Abnormal) C  TESTS:  _ GS  DATE/TIME CALLED: _ 2018 08:45:08  CALLED TO: Jennifer Osorio RN  READ BACK (2 Patient Identifiers)(Y/N): _ Y  READ BACK VALUES (Y/N): _ Y  CALLED BY: Jennifer Marie ( @ 17:28)       URINE CX          LABS:                        13.7   10.7  )-----------( 164      ( 20 Aug 2018 06:47 )             40.8         137  |  100  |  9.0  ----------------------------<  144<H>  3.5   |  23.0  |  0.80    Ca    8.8      20 Aug 2018 06:47  Mg     1.9         TPro  8.0  /  Alb  4.6  /  TBili  1.5  /  DBili  x   /  AST  22  /  ALT  28  /  AlkPhos  74  0819    PT/INR - ( 19 Aug 2018 17:09 )   PT: 13.6 sec;   INR: 1.23 ratio         PTT - ( 19 Aug 2018 17:09 )  PTT:30.7 sec  Urinalysis Basic - ( 20 Aug 2018 03:06 )    Color: Yellow / Appearance: Clear / S.015 / pH: x  Gluc: x / Ketone: Negative  / Bili: Negative / Urobili: 1 mg/dL   Blood: x / Protein: Negative mg/dL / Nitrite: Negative   Leuk Esterase: Negative / RBC: x / WBC x   Sq Epi: x / Non Sq Epi: x / Bacteria: x        RADIOLOGY & ADDITIONAL STUDIES:      ASSESSMENT/PLAN      58 years old male with no known PMH comes with left hand pain and swelling. As per patient, 4 days ago he accidently had puncture wound in his left 4th finger from cat's scratch while sleeping. Next day, he noticed some pain/swelling in his finger. He started using Hydrogen Peroxide and Epsom Salt but the pain and swelling are getting worse. He also has noticed redness around the finger for the last two days. Denies fever but was feeling warm today. No purulent discharge from wound but he expressed some serosanguinous fluid.   PT WITH BLOOD CX GM NEG LUISITO  I AM CONCERNED ABOUT PASTEURELLA WILL CHANGE ABX TO IV UNASYN   RECOMMEND WARM SOAKS ND ELEVATION WILL REPEAT BLOOD CX            ZENON MCGILL MD

## 2018-08-21 ENCOUNTER — TRANSCRIPTION ENCOUNTER (OUTPATIENT)
Age: 58
End: 2018-08-21

## 2018-08-21 DIAGNOSIS — R05 COUGH: ICD-10-CM

## 2018-08-21 DIAGNOSIS — R78.81 BACTEREMIA: ICD-10-CM

## 2018-08-21 LAB
CULTURE RESULTS: NO GROWTH — SIGNIFICANT CHANGE UP
SPECIMEN SOURCE: SIGNIFICANT CHANGE UP
VANCOMYCIN TROUGH SERPL-MCNC: <4 UG/ML — LOW (ref 10–20)

## 2018-08-21 PROCEDURE — 99232 SBSQ HOSP IP/OBS MODERATE 35: CPT | Mod: GC

## 2018-08-21 PROCEDURE — 99233 SBSQ HOSP IP/OBS HIGH 50: CPT

## 2018-08-21 RX ORDER — PANTOPRAZOLE SODIUM 20 MG/1
40 TABLET, DELAYED RELEASE ORAL
Qty: 0 | Refills: 0 | Status: DISCONTINUED | OUTPATIENT
Start: 2018-08-21 | End: 2018-08-22

## 2018-08-21 RX ORDER — FLUTICASONE PROPIONATE 50 MCG
1 SPRAY, SUSPENSION NASAL DAILY
Qty: 0 | Refills: 0 | Status: DISCONTINUED | OUTPATIENT
Start: 2018-08-21 | End: 2018-08-22

## 2018-08-21 RX ORDER — ALBUTEROL 90 UG/1
2.5 AEROSOL, METERED ORAL ONCE
Qty: 0 | Refills: 0 | Status: DISCONTINUED | OUTPATIENT
Start: 2018-08-21 | End: 2018-08-22

## 2018-08-21 RX ADMIN — AMPICILLIN SODIUM AND SULBACTAM SODIUM 200 GRAM(S): 250; 125 INJECTION, POWDER, FOR SUSPENSION INTRAMUSCULAR; INTRAVENOUS at 17:45

## 2018-08-21 RX ADMIN — PANTOPRAZOLE SODIUM 40 MILLIGRAM(S): 20 TABLET, DELAYED RELEASE ORAL at 12:16

## 2018-08-21 RX ADMIN — Medication 1 TABLET(S): at 14:22

## 2018-08-21 RX ADMIN — Medication 1 SPRAY(S): at 12:38

## 2018-08-21 RX ADMIN — Medication 600 MILLIGRAM(S): at 13:36

## 2018-08-21 RX ADMIN — AMPICILLIN SODIUM AND SULBACTAM SODIUM 200 GRAM(S): 250; 125 INJECTION, POWDER, FOR SUSPENSION INTRAMUSCULAR; INTRAVENOUS at 00:45

## 2018-08-21 RX ADMIN — Medication 1 TABLET(S): at 13:35

## 2018-08-21 RX ADMIN — Medication 250 MILLIGRAM(S): at 17:45

## 2018-08-21 RX ADMIN — Medication 600 MILLIGRAM(S): at 14:22

## 2018-08-21 RX ADMIN — Medication 1 TABLET(S): at 12:16

## 2018-08-21 RX ADMIN — AMPICILLIN SODIUM AND SULBACTAM SODIUM 200 GRAM(S): 250; 125 INJECTION, POWDER, FOR SUSPENSION INTRAMUSCULAR; INTRAVENOUS at 06:33

## 2018-08-21 RX ADMIN — Medication 250 MILLIGRAM(S): at 06:33

## 2018-08-21 RX ADMIN — ENOXAPARIN SODIUM 40 MILLIGRAM(S): 100 INJECTION SUBCUTANEOUS at 12:16

## 2018-08-21 RX ADMIN — AMPICILLIN SODIUM AND SULBACTAM SODIUM 200 GRAM(S): 250; 125 INJECTION, POWDER, FOR SUSPENSION INTRAMUSCULAR; INTRAVENOUS at 12:38

## 2018-08-21 NOTE — DISCHARGE NOTE ADULT - PLAN OF CARE
Improved function and pain control Follow-up with ORTHO-HAND within one week for re-evaluation.  * Keep elevated to reduce swelling  * Take medications as prescribed  * RETURN to emergency department if condition worsens after discharge

## 2018-08-21 NOTE — DISCHARGE NOTE ADULT - HOSPITAL COURSE
Assessment and Plan:   · Assessment		  58 years old male with no known PMH with left hand pain and swelling due to recent cat bite - started to improve with IV abx    Problem/Plan - 1:  ·  Problem: Cellulitis of hand, left.  Plan: due to cat bite - requires Pasturella multiocida coverage - agreed with change to IV unasyn and as clinically improved with negative repeat BCx will change to po augmentin for 10 more days with probiotic  + Bacteremia with Pateurella resolved.     Attending Attestation:   No PNA - likely PND with GERD vs viral URI - continue PPI and nasal spray  HA - tension - resolved  Medically stable for DC.     50 minutes spent on total encounter; more than 50% of the visit was spent counseling and/or coordinating care by the attending physician.     Plan discussed with ID.

## 2018-08-21 NOTE — PROGRESS NOTE ADULT - SUBJECTIVE AND OBJECTIVE BOX
[facility]  INFECTIOUS DISEASES AND INTERNAL MEDICINE at [place]  =======================================================  Isaiah Arreola MD  Diplomates American Board of Internal Medicine and Infectious Diseases  =======================================================    KRISHNA HENRY [id number]    Follow up LEFT HAND CELLULITIS CAT RELATED    Allergies:  No Known Allergies      Medications:  acetaminophen   Tablet 650 milliGRAM(s) Oral every 6 hours PRN  acetaminophen   Tablet. 650 milliGRAM(s) Oral every 6 hours PRN  acetaminophen 325 mG/butalbital 50 mG/caffeine 40 mG 1 Tablet(s) Oral every 4 hours PRN  ampicillin/sulbactam  IVPB      ampicillin/sulbactam  IVPB 3 Gram(s) IV Intermittent every 6 hours  enoxaparin Injectable 40 milliGRAM(s) SubCutaneous daily  ibuprofen  Tablet 400 milliGRAM(s) Oral every 6 hours PRN  ibuprofen  Tablet 600 milliGRAM(s) Oral every 6 hours PRN  multivitamin 1 Tablet(s) Oral daily  pantoprazole    Tablet 40 milliGRAM(s) Oral before breakfast  saccharomyces boulardii 250 milliGRAM(s) Oral two times a day    SOCIAL       FAMILY   FAMILY HISTORY:  Family history of lung cancer (Father)  Family history of hypertension (Father)    REVIEW OF SYSTEMS:  CONSTITUTIONAL:  No Fever or chills  HEENT:   No diplopia or blurred vision.  No earache, sore throat or runny nose.  CARDIOVASCULAR:  No pressure, squeezing, strangling, tightness, heaviness or aching about the chest, neck, axilla or epigastrium.  RESPIRATORY:  No cough, shortness of breath, PND or orthopnea.  GASTROINTESTINAL:  No nausea, vomiting or diarrhea.  GENITOURINARY:  No dysuria, frequency or urgency. No Blood in urine  MUSCULOSKELETAL:   AS PER HPI  SKIN:  No change in skin, hair or nails.  NEUROLOGIC:  No paresthesias, fasciculations, seizures or weakness.  PSYCHIATRIC:  No disorder of thought or mood.  ENDOCRINE:  No heat or cold intolerance, polyuria or polydipsia.  HEMATOLOGICAL:  No easy bruising or bleeding.            Physical Exam:  ICU Vital Signs Last 24 Hrs  T(C): 37.2 (21 Aug 2018 09:01), Max: 37.4 (20 Aug 2018 12:32)  T(F): 99 (21 Aug 2018 09:), Max: 99.4 (20 Aug 2018 12:32)  HR: 78 (21 Aug 2018 09:01) (68 - 86)  BP: 120/65 (21 Aug 2018 09:01) (117/77 - 141/81)  BP(mean): --  ABP: --  ABP(mean): --  RR: 18 (21 Aug 2018 09:) (18 - 18)  SpO2: 97% (21 Aug 2018 09:) (96% - 98%)    GEN: NAD, pleasant  HEENT: normocephalic and atraumatic. EOMI. JORGE.    NECK: Supple. No carotid bruits.  No lymphadenopathy or thyromegaly.  LUNGS: Clear to auscultation.  HEART: Regular rate and rhythm without murmur.  ABDOMEN: Soft, nontender, and nondistended.  Positive bowel sounds.    : No CVA tenderness  EXTREMITIES: LEFT HAND CELLULITIS DECREASED EDEMA ARMTH AND TENDERNESS  MSK: no joint swelling  NEUROLOGIC: Cranial nerves II through XII are grossly intact.  PSYCHIATRIC: Appropriate affect .  SKIN: No ulceration or induration present.        Labs:      137  |  100  |  9.0  ----------------------------<  144<H>  3.5   |  23.0  |  0.80    Ca    8.8      20 Aug 2018 06:47  Mg     1.9         TPro  8.0  /  Alb  4.6  /  TBili  1.5  /  DBili  x   /  AST  22  /  ALT  28  /  AlkPhos  74                            13.7   10.7  )-----------( 164      ( 20 Aug 2018 06:47 )             40.8       PT/INR - ( 19 Aug 2018 17:09 )   PT: 13.6 sec;   INR: 1.23 ratio         PTT - ( 19 Aug 2018 17:09 )  PTT:30.7 sec  Urinalysis Basic - ( 20 Aug 2018 03:06 )    Color: Yellow / Appearance: Clear / S.015 / pH: x  Gluc: x / Ketone: Negative  / Bili: Negative / Urobili: 1 mg/dL   Blood: x / Protein: Negative mg/dL / Nitrite: Negative   Leuk Esterase: Negative / RBC: x / WBC x   Sq Epi: x / Non Sq Epi: x / Bacteria: x      LIVER FUNCTIONS - ( 19 Aug 2018 17:09 )  Alb: 4.6 g/dL / Pro: 8.0 g/dL / ALK PHOS: 74 U/L / ALT: 28 U/L / AST: 22 U/L / GGT: x               CAPILLARY BLOOD GLUCOSE            RECENT CULTURES:   @ 03:12 .Urine Clean Catch (Midstream)     No growth         @ 19:00 .Abscess Arm - Left     Culture in progress    Few White blood cells  No organisms seen       @ 17:28 .Blood Blood-Peripheral Blood Culture PCR    Growth in aerobic bottle: Gram Negative Rods  Aerobic Bottle: 11.54 Hours to positivity  Anaerobic Bottle: No growth to date  .  ***Blood Panel PCR results on this specimen are available  approximately 3 hours after the Gram stain result.***  Gram stain, PCR, and/or culture results may not always  correspond due to difference in methodologies.  ************************************************************  This PCR assay was performed using [employer].  The following targets are tested for: Enterococcus,  vancomycin resistant enterococci, Listeria monocytogenes,  coagulase negative staphylococci, S. aureus,  methicillin resistant S. aureus, Streptococcus agalactiae  (Group B), S. pneumoniae, S. pyogenes (Group A),  Acinetobacter baumannii, Enterobacter cloacae, E. coli,  Klebsiella oxytoca, K. pneumoniae, Proteus sp.,  Serratia marcescens, Haemophilus influenzae,  Neisseria meningitidis, Pseudomonas aeruginosa, Candida  albicans, C. glabrata, C krusei, C parapsilosis,  C. tropicalis and the KPC resistance gene.  "Due to technical problems, Proteus sp. will Not be reported as part of  the BCID panel until further notice"  .  TYPE: (C=Critical, N=Notification, A=Abnormal) C  TESTS:  _ GS  DATE/TIME CALLED: _ 2018 08:45:08  CALLED TO: Jennifer Osorio RN  READ BACK (2 Patient Identifiers)(Y/N): _ Y  READ BACK VALUES (Y/N): _ Y  CALLED BY: Jennifer Marie

## 2018-08-21 NOTE — PROGRESS NOTE ADULT - PROBLEM SELECTOR PLAN 3
- pt reports chronic cough worse at night x 1 week now  - CXR upon admission reviewed, no acute findings  - viral bronchitis vs GERD related. Will add Protonix, albuterol PRN and nasocort.

## 2018-08-21 NOTE — PROGRESS NOTE ADULT - ASSESSMENT
58 years old male with no known PMH comes with left hand pain and swelling. As per patient, 4 days ago he accidently had puncture wound in his left 4th finger from cat's scratch while sleeping. Next day, he noticed some pain/swelling in his finger. He started using Hydrogen Peroxide and Epsom Salt but the pain and swelling are getting worse. He also has noticed redness around the finger for the last two days. Denies fever but was feeling warm today. No purulent discharge from wound but he expressed some serosanguinous fluid.   BLOOD CX WITH GM NEG LUISITO YET TO BE IDENTIFIED CONCERN OVER POSSIBEL PASTUERELLA  CONTINUE IV UNASYN FOR NOW  WARM COMPRESSES ELEVATION

## 2018-08-21 NOTE — DISCHARGE NOTE ADULT - PATIENT PORTAL LINK FT
You can access the [facility] Portal, offered by [facility], by registering with the following website: [email or web address]

## 2018-08-21 NOTE — DISCHARGE NOTE ADULT - CARE PROVIDER_API CALL
Renea Tellez), Orthopaedic Surgery; Surgery of the Hand  [address] [place] [address]  Phone: [phone]  Fax: [phone] Renea Tellez), Orthopaedic Surgery; Surgery of the Hand  [address] [place] [address]  Phone: [phone]  Fax: [phone]    Landry Ya), Internal Medicine  [address] [place] [address]  Phone: [phone]  Fax: [phone]

## 2018-08-21 NOTE — PROGRESS NOTE ADULT - SUBJECTIVE AND OBJECTIVE BOX
KRISHNA HENRY    [id number]    History:  The patient is being followed for non-operative management of left hand cellulitis. The patient reports improved condition since last examination with increased left 4th finger flexion.  Denies nausea, vomiting, chest pain, shortness of breath, abdominal pain or fever. No new complaints. No acute motor or sensory changes are reported.        Vital Signs Last 24 Hrs  T(C): 36.7 (20 Aug 2018 23:59), Max: 37.4 (20 Aug 2018 12:32)  T(F): 98.1 (20 Aug 2018 23:59), Max: 99.4 (20 Aug 2018 12:32)  HR: 72 (20 Aug 2018 23:59) (68 - 86)  BP: 120/74 (20 Aug 2018 23:59) (117/77 - 141/81)  BP(mean): --  RR: 18 (20 Aug 2018 23:59) (18 - 18)  SpO2: 96% (20 Aug 2018 23:59) (96% - 98%)                          13.7   10.7  )-----------( 164      ( 20 Aug 2018 06:47 )             40.8     08-20    137  |  100  |  9.0  ----------------------------<  144<H>  3.5   |  23.0  |  0.80    Ca    8.8      20 Aug 2018 06:47  Mg     1.9     08-20    TPro  8.0  /  Alb  4.6  /  TBili  1.5  /  DBili  x   /  AST  22  /  ALT  28  /  AlkPhos  74  08-19      MEDICATIONS  (STANDING):  ampicillin/sulbactam  IVPB      ampicillin/sulbactam  IVPB 3 Gram(s) IV Intermittent every 6 hours  enoxaparin Injectable 40 milliGRAM(s) SubCutaneous daily  multivitamin 1 Tablet(s) Oral daily  saccharomyces boulardii 250 milliGRAM(s) Oral two times a day    MEDICATIONS  (PRN):  acetaminophen   Tablet 650 milliGRAM(s) Oral every 6 hours PRN For Temp greater than 38 C (100.4 F)  acetaminophen   Tablet. 650 milliGRAM(s) Oral every 6 hours PRN Mild Pain (1 - 3)  acetaminophen 325 mG/butalbital 50 mG/caffeine 40 mG 1 Tablet(s) Oral every 4 hours PRN headache  ibuprofen  Tablet 400 milliGRAM(s) Oral every 6 hours PRN Moderate Pain  ibuprofen  Tablet 600 milliGRAM(s) Oral every 6 hours PRN Severe Pain      Physical exam: There is no erythema of the affected area. This appears to be improved. There is mild tenderness of the affected area. No fluctuance. No drainage or discharge. No proximal streaking or spreading is noted. +mild left dorsal hand and left 4th finger soft tissue swelling. + small area of non-fluctuant macerated skin of the left 4th finger, middle phalanx of the radial aspect.  Compartments are soft. No forearm tenderness. Sensation to light touch is grossly intact without focal deficit and is symmetric bilaterally. + full extension of all fingers. + mildly limited left 4th finger flexion as a result of pain. No focal motor weaknesses are appreciated. 2+ dorsalis pedis pulse. Capillary refill is less than 2 seconds. No cyanosis..    Primary Orthopedic Assessment:  • IMPROVED left hand and 4th finger cellulitis      Plan:   * No ortho-hand surgical intervention needed.  • DVT prophylaxis as prescribed  • Continue IV antibiotics   • Elevation of the affected extremity  • Pain control as clinically indicated  * light finger wound debridement - dry dressing applied - may remove for washing  * Ortho ready for discharge home once deemed medically stable KRISHNA HENRY    [id number]    History:  The patient is being followed for non-operative management of left hand cellulitis. The patient reports improved condition since last examination with increased left 4th finger flexion.  Denies nausea, vomiting, chest pain, shortness of breath, abdominal pain or fever. No new complaints. No acute motor or sensory changes are reported.        Vital Signs Last 24 Hrs  T(C): 36.7 (20 Aug 2018 23:59), Max: 37.4 (20 Aug 2018 12:32)  T(F): 98.1 (20 Aug 2018 23:59), Max: 99.4 (20 Aug 2018 12:32)  HR: 72 (20 Aug 2018 23:59) (68 - 86)  BP: 120/74 (20 Aug 2018 23:59) (117/77 - 141/81)  BP(mean): --  RR: 18 (20 Aug 2018 23:59) (18 - 18)  SpO2: 96% (20 Aug 2018 23:59) (96% - 98%)                          13.7   10.7  )-----------( 164      ( 20 Aug 2018 06:47 )             40.8     08-20    137  |  100  |  9.0  ----------------------------<  144<H>  3.5   |  23.0  |  0.80    Ca    8.8      20 Aug 2018 06:47  Mg     1.9     08-20    TPro  8.0  /  Alb  4.6  /  TBili  1.5  /  DBili  x   /  AST  22  /  ALT  28  /  AlkPhos  74  08-19      MEDICATIONS  (STANDING):  ampicillin/sulbactam  IVPB      ampicillin/sulbactam  IVPB 3 Gram(s) IV Intermittent every 6 hours  enoxaparin Injectable 40 milliGRAM(s) SubCutaneous daily  multivitamin 1 Tablet(s) Oral daily  saccharomyces boulardii 250 milliGRAM(s) Oral two times a day    MEDICATIONS  (PRN):  acetaminophen   Tablet 650 milliGRAM(s) Oral every 6 hours PRN For Temp greater than 38 C (100.4 F)  acetaminophen   Tablet. 650 milliGRAM(s) Oral every 6 hours PRN Mild Pain (1 - 3)  acetaminophen 325 mG/butalbital 50 mG/caffeine 40 mG 1 Tablet(s) Oral every 4 hours PRN headache  ibuprofen  Tablet 400 milliGRAM(s) Oral every 6 hours PRN Moderate Pain  ibuprofen  Tablet 600 milliGRAM(s) Oral every 6 hours PRN Severe Pain      Physical exam: There is no erythema of the affected area. This appears to be improved. There is mild tenderness of the affected area. No fluctuance. No drainage or discharge. No proximal streaking or spreading is noted. +mild left dorsal hand and left 4th finger soft tissue swelling. + small area of non-fluctuant macerated skin of the left 4th finger, middle phalanx of the radial aspect.  Compartments are soft. No forearm tenderness. Sensation to light touch is grossly intact without focal deficit and is symmetric bilaterally. + full extension of all fingers. + mildly limited left 4th finger flexion as a result of pain. No focal motor weaknesses are appreciated. 2+ dorsalis pedis pulse. Capillary refill is less than 2 seconds. No cyanosis..    Primary Orthopedic Assessment:  • IMPROVED left hand and 4th finger cellulitis      Plan:   * No ortho-hand surgical intervention needed.  • DVT prophylaxis as prescribed  • Continue IV antibiotics   • Elevation of the affected extremity  • Pain control as clinically indicated  * light finger wound debridement - dry dressing applied - may remove for washing  * Ortho ready for discharge home once deemed medically stable and has transitioned to oral ABX

## 2018-08-21 NOTE — DISCHARGE NOTE ADULT - MEDICATION SUMMARY - MEDICATIONS TO TAKE
I will START or STAY ON the medications listed below when I get home from the hospital:    acetaminophen 325 mg oral tablet  -- 2 tab(s) by mouth every 6 hours, As needed, For Temp greater than 38 C (100.4 F)  -- Indication: For fever    acetaminophen 325 mg oral tablet  -- 2 tab(s) by mouth every 6 hours, As needed, Mild Pain (1 - 3)  -- Indication: For Pain    ibuprofen 400 mg oral tablet  -- 1 tab(s) by mouth every 6 hours, As needed, Moderate Pain  -- Indication: For Pain    fluticasone 50 mcg/inh nasal spray  -- 1 spray(s) into nose once a day  -- Indication: For PnD    Augmentin 875 mg-125 mg oral tablet  -- 875 milligram(s) by mouth every 12 hours   -- Finish all this medication unless otherwise directed by prescriber.  Take with food or milk.    -- Indication: For Cellulitis of hand, left    saccharomyces boulardii lyo 250 mg oral capsule  -- 1 cap(s) by mouth 2 times a day  -- Indication: For supplement    pantoprazole 40 mg oral delayed release tablet  -- 1 tab(s) by mouth once a day (before a meal)  -- Indication: For GERD    Multiple Vitamins oral tablet  -- 1 tab(s) by mouth once a day  -- Indication: For supplement

## 2018-08-21 NOTE — PROGRESS NOTE ADULT - PROBLEM SELECTOR PLAN 1
-Due to cat bite/scratch  -Requires Pasturella multiocida coverage - agreed with change to IV unasyn. Pt with noticeable improvement, still with some erythema/swelling/decreased range of motion, will likely benefit from additional day of IV abx.   - Follow up repeat Bcx given initial BCX +with GNR bacteremia  - sensitivities pending -Due to cat bite/scratch  -Requires Pasturella multiocida coverage - agreed with change to IV unasyn. Pt with noticeable improvement, still with some erythema/swelling/decreased range of motion, will likely benefit from additional day of IV abx.   - Follow up repeat Bcx given initial BCX +with GNR bacteremia  - sensitivities pending  - ortho consult appreciated, no ortho-hand intervention -Due to cat bite/scratch  -Requires Pasturella multiocida coverage - agreed with change to IV unasyn. Pt with noticeable improvement, still with some erythema/swelling/decreased range of motion  - Follow up repeat Bcx given initial BCX +with GNR bacteremia  - sensitivities pending  - ortho consult appreciated, no ortho-hand intervention

## 2018-08-21 NOTE — PROGRESS NOTE ADULT - SUBJECTIVE AND OBJECTIVE BOX
CC: Left hand pain and swelling (19 Aug 2018 19:24)    HPI: 58 years old male with no known PMH with left hand pain and swelling due to recent cat bite - started to improve with IV abx.     INTERVAL HPI/OVERNIGHT EVENTS: Seen in consultation by ID, recommends to continue IV unasyn and repeat Bcx. Otherwise no new interval events.    Pt seen and examined at bedside. Pt ano x 3, sitting up in bed. Pt gives long detailed story of his cat.. Pt states overall feeling better, pain/redness/swelling and range of motiong has improved. Pt notes chronic productive cough x 1 week now, denies SOB, fever, chills and denies recently sick contact.  denies additional medical complaints. ROS negative and VSS    Vital Signs Last 24 Hrs  T(C): 37.2 (21 Aug 2018 09:01), Max: 37.4 (20 Aug 2018 12:32)  T(F): 99 (21 Aug 2018 09:), Max: 99.4 (20 Aug 2018 12:32)  HR: 78 (21 Aug 2018 09:) (68 - 86)  BP: 120/65 (21 Aug 2018 09:01) (117/77 - 141/81)  BP(mean): --  RR: 18 (21 Aug 2018 09:) (18 - 18)  SpO2: 97% (21 Aug 2018 09:) (96% - 98%    PHYSICAL EXAM:  General: Well developed; well nourished; in no acute distress  Eyes: PERRLA, EOMI; conjunctiva and sclera clear  Head: Normocephalic; atraumatic  Respiratory: No wheezes, rales or rhonchi  Cardiovascular: Regular rate and rhythm. S1 and S2   Gastrointestinal: Soft non-tender non-distended; Normal bowel sounds  Extremities: Normal range of motion, No clubbing, cyanosis or edema  Neurological: Alert and oriented x4  Skin: Warm and dry. left UE improving demarcated area of erythema and edema involving middle finger with signs of bite, no striking. Improvement in ROM of finger and wrist noticed today. +pulses.   Musculoskeletal: Normal gait, tone, without deformitie                             13.7   10.7  )-----------( 164      ( 20 Aug 2018 06:47 )             40.8     20 Aug 2018 06:47    137    |  100    |  9.0    ----------------------------<  144    3.5     |  23.0   |  0.80     Ca    8.8        20 Aug 2018 06:47  Mg     1.9       20 Aug 2018 06:47    TPro  8.0    /  Alb  4.6    /  TBili  1.5    /  DBili  x      /  AST  22     /  ALT  28     /  AlkPhos  74     19 Aug 2018 17:09    PT/INR - ( 19 Aug 2018 17:09 )   PT: 13.6 sec;   INR: 1.23 ratio         PTT - ( 19 Aug 2018 17:09 )  PTT:30.7 sec    LIVER FUNCTIONS - ( 19 Aug 2018 17:09 )  Alb: 4.6 g/dL / Pro: 8.0 g/dL / ALK PHOS: 74 U/L / ALT: 28 U/L / AST: 22 U/L / GGT: x           Urinalysis Basic - ( 20 Aug 2018 03:06 )    Color: Yellow / Appearance: Clear / S.015 / pH: x  Gluc: x / Ketone: Negative  / Bili: Negative / Urobili: 1 mg/dL   Blood: x / Protein: Negative mg/dL / Nitrite: Negative   Leuk Esterase: Negative / RBC: x / WBC x   Sq Epi: x / Non Sq Epi: x / Bacteria: x    MEDICATIONS  (STANDING):  ampicillin/sulbactam  IVPB      ampicillin/sulbactam  IVPB 3 Gram(s) IV Intermittent every 6 hours  enoxaparin Injectable 40 milliGRAM(s) SubCutaneous daily  multivitamin 1 Tablet(s) Oral daily  saccharomyces boulardii 250 milliGRAM(s) Oral two times a day    MEDICATIONS  (PRN):  acetaminophen   Tablet 650 milliGRAM(s) Oral every 6 hours PRN For Temp greater than 38 C (100.4 F)  acetaminophen   Tablet. 650 milliGRAM(s) Oral every 6 hours PRN Mild Pain (1 - 3)  acetaminophen 325 mG/butalbital 50 mG/caffeine 40 mG 1 Tablet(s) Oral every 4 hours PRN headache  ibuprofen  Tablet 400 milliGRAM(s) Oral every 6 hours PRN Moderate Pain  ibuprofen  Tablet 600 milliGRAM(s) Oral every 6 hours PRN Severe Pain    RADIOLOGY & ADDITIONAL TESTS: personally visualized CC: Left hand pain and swelling (19 Aug 2018 19:24)    HPI: 58 years old male with no known PMH with left hand pain and swelling due to recent cat bite - started to improve with IV abx.     INTERVAL HPI/OVERNIGHT EVENTS: Seen in consultation by ID, recommends to continue IV unasyn and repeat Bcx. Otherwise no new interval events.    Pt seen and examined at bedside. Pt ano x 3, sitting up in bed. Pt gives long detailed story of his cat.. Pt states overall feeling better, pain/redness/swelling and range of motiong has improved. Pt notes chronic productive cough x 1 week now worse at night, denies SOB, fever, chills and denies recently sick contact.  denies additional medical complaints. ROS negative and VSS    Vital Signs Last 24 Hrs  T(C): 37.2 (21 Aug 2018 09:01), Max: 37.4 (20 Aug 2018 12:32)  T(F): 99 (21 Aug 2018 09:), Max: 99.4 (20 Aug 2018 12:32)  HR: 78 (21 Aug 2018 09:) (68 - 86)  BP: 120/65 (21 Aug 2018 09:01) (117/77 - 141/81)  BP(mean): --  RR: 18 (21 Aug 2018 09:) (18 - 18)  SpO2: 97% (21 Aug 2018 09:) (96% - 98%    PHYSICAL EXAM:  General: Well developed; well nourished; in no acute distress  Eyes: PERRLA, EOMI; conjunctiva and sclera clear  Head: Normocephalic; atraumatic  Respiratory: course breath sounds, no rales/rhonchi/wheeze  Cardiovascular: Regular rate and rhythm. S1 and S2   Gastrointestinal: Soft non-tender non-distended; Normal bowel sounds  Extremities: Normal range of motion, No clubbing, cyanosis or edema  Neurological: Alert and oriented x4  Skin: Warm and dry. left UE improving demarcated area of erythema and edema involving middle finger with signs of bite, no striking. Improvement in ROM of finger and wrist noticed today. +pulses.                              13.7   10.7  )-----------( 164      ( 20 Aug 2018 06:47 )             40.8     20 Aug 2018 06:47    137    |  100    |  9.0    ----------------------------<  144    3.5     |  23.0   |  0.80     Ca    8.8        20 Aug 2018 06:47  Mg     1.9       20 Aug 2018 06:47    TPro  8.0    /  Alb  4.6    /  TBili  1.5    /  DBili  x      /  AST  22     /  ALT  28     /  AlkPhos  74     19 Aug 2018 17:09    PT/INR - ( 19 Aug 2018 17:09 )   PT: 13.6 sec;   INR: 1.23 ratio         PTT - ( 19 Aug 2018 17:09 )  PTT:30.7 sec    LIVER FUNCTIONS - ( 19 Aug 2018 17:09 )  Alb: 4.6 g/dL / Pro: 8.0 g/dL / ALK PHOS: 74 U/L / ALT: 28 U/L / AST: 22 U/L / GGT: x           Urinalysis Basic - ( 20 Aug 2018 03:06 )    Color: Yellow / Appearance: Clear / S.015 / pH: x  Gluc: x / Ketone: Negative  / Bili: Negative / Urobili: 1 mg/dL   Blood: x / Protein: Negative mg/dL / Nitrite: Negative   Leuk Esterase: Negative / RBC: x / WBC x   Sq Epi: x / Non Sq Epi: x / Bacteria: x    MEDICATIONS  (STANDING):  ampicillin/sulbactam  IVPB      ampicillin/sulbactam  IVPB 3 Gram(s) IV Intermittent every 6 hours  enoxaparin Injectable 40 milliGRAM(s) SubCutaneous daily  multivitamin 1 Tablet(s) Oral daily  saccharomyces boulardii 250 milliGRAM(s) Oral two times a day    MEDICATIONS  (PRN):  acetaminophen   Tablet 650 milliGRAM(s) Oral every 6 hours PRN For Temp greater than 38 C (100.4 F)  acetaminophen   Tablet. 650 milliGRAM(s) Oral every 6 hours PRN Mild Pain (1 - 3)  acetaminophen 325 mG/butalbital 50 mG/caffeine 40 mG 1 Tablet(s) Oral every 4 hours PRN headache  ibuprofen  Tablet 400 milliGRAM(s) Oral every 6 hours PRN Moderate Pain  ibuprofen  Tablet 600 milliGRAM(s) Oral every 6 hours PRN Severe Pain    RADIOLOGY & ADDITIONAL TESTS: personally visualized

## 2018-08-21 NOTE — PROGRESS NOTE ADULT - ASSESSMENT
58 years old male with no known PMH with left hand pain and swelling due to recent cat bite, found with +GN bacteremia, start to improve on IV abx, in consult with ID pending repeat BCx.

## 2018-08-21 NOTE — DISCHARGE NOTE ADULT - CARE PLAN
Principal Discharge DX:	Cellulitis of hand, left  Goal:	Improved function and pain control  Assessment and plan of treatment:	Follow-up with [facility] within one week for re-evaluation.  * Keep elevated to reduce swelling  * Take medications as prescribed  * RETURN to emergency department if condition worsens after discharge

## 2018-08-21 NOTE — PROGRESS NOTE ADULT - PROBLEM SELECTOR PLAN 2
- +BCx with GNR  - repeat cultures drawn 8/20, prelim to result tomorrow 8/22  - sensitivities pending  - continue current abx as pt noted with improvement  - ID consult appreciated - +BCx with GNR  - repeat cultures drawn 8/20, prelim to result tomorrow 8/22  - sensitivities pending  - continue current abx as pt noted with improvement  - ID consult appreciated, further abx plan as per ID.   - leukocytosis resolved, afebrile, clinically improving - +BCx with GNR  - repeat cultures drawn 8/20, prelim to result tomorrow 8/22  - sensitivities pending  - continue current abx as pt noted with improvement  - ID consult appreciated  - leukocytosis resolved, afebrile, clinically improving

## 2018-08-22 VITALS
TEMPERATURE: 98 F | RESPIRATION RATE: 18 BRPM | DIASTOLIC BLOOD PRESSURE: 82 MMHG | OXYGEN SATURATION: 98 % | SYSTOLIC BLOOD PRESSURE: 122 MMHG | HEART RATE: 77 BPM

## 2018-08-22 LAB
-  AMPICILLIN/SULBACTAM: SIGNIFICANT CHANGE UP
-  CEFAZOLIN: SIGNIFICANT CHANGE UP
-  CLINDAMYCIN: SIGNIFICANT CHANGE UP
-  ERYTHROMYCIN: SIGNIFICANT CHANGE UP
-  GENTAMICIN: SIGNIFICANT CHANGE UP
-  OXACILLIN: SIGNIFICANT CHANGE UP
-  PENICILLIN: SIGNIFICANT CHANGE UP
-  RIFAMPIN: SIGNIFICANT CHANGE UP
-  TETRACYCLINE: SIGNIFICANT CHANGE UP
-  TRIMETHOPRIM/SULFAMETHOXAZOLE: SIGNIFICANT CHANGE UP
-  VANCOMYCIN: SIGNIFICANT CHANGE UP
METHOD TYPE: SIGNIFICANT CHANGE UP

## 2018-08-22 PROCEDURE — 81003 URINALYSIS AUTO W/O SCOPE: CPT

## 2018-08-22 PROCEDURE — 94640 AIRWAY INHALATION TREATMENT: CPT

## 2018-08-22 PROCEDURE — 85730 THROMBOPLASTIN TIME PARTIAL: CPT

## 2018-08-22 PROCEDURE — 87205 SMEAR GRAM STAIN: CPT

## 2018-08-22 PROCEDURE — 96374 THER/PROPH/DIAG INJ IV PUSH: CPT

## 2018-08-22 PROCEDURE — 87186 SC STD MICRODIL/AGAR DIL: CPT

## 2018-08-22 PROCEDURE — 83735 ASSAY OF MAGNESIUM: CPT

## 2018-08-22 PROCEDURE — 99232 SBSQ HOSP IP/OBS MODERATE 35: CPT

## 2018-08-22 PROCEDURE — 73130 X-RAY EXAM OF HAND: CPT

## 2018-08-22 PROCEDURE — 36415 COLL VENOUS BLD VENIPUNCTURE: CPT

## 2018-08-22 PROCEDURE — 71045 X-RAY EXAM CHEST 1 VIEW: CPT

## 2018-08-22 PROCEDURE — 80202 ASSAY OF VANCOMYCIN: CPT

## 2018-08-22 PROCEDURE — 90471 IMMUNIZATION ADMIN: CPT

## 2018-08-22 PROCEDURE — 87070 CULTURE OTHR SPECIMN AEROBIC: CPT

## 2018-08-22 PROCEDURE — 87150 DNA/RNA AMPLIFIED PROBE: CPT

## 2018-08-22 PROCEDURE — 85027 COMPLETE CBC AUTOMATED: CPT

## 2018-08-22 PROCEDURE — 85610 PROTHROMBIN TIME: CPT

## 2018-08-22 PROCEDURE — 87040 BLOOD CULTURE FOR BACTERIA: CPT

## 2018-08-22 PROCEDURE — 80048 BASIC METABOLIC PNL TOTAL CA: CPT

## 2018-08-22 PROCEDURE — 96375 TX/PRO/DX INJ NEW DRUG ADDON: CPT

## 2018-08-22 PROCEDURE — 80053 COMPREHEN METABOLIC PANEL: CPT

## 2018-08-22 PROCEDURE — 99285 EMERGENCY DEPT VISIT HI MDM: CPT | Mod: 25

## 2018-08-22 PROCEDURE — 90715 TDAP VACCINE 7 YRS/> IM: CPT

## 2018-08-22 PROCEDURE — 87086 URINE CULTURE/COLONY COUNT: CPT

## 2018-08-22 PROCEDURE — 93005 ELECTROCARDIOGRAM TRACING: CPT

## 2018-08-22 PROCEDURE — 99239 HOSP IP/OBS DSCHRG MGMT >30: CPT

## 2018-08-22 RX ORDER — SACCHAROMYCES BOULARDII 250 MG
1 POWDER IN PACKET (EA) ORAL
Qty: 20 | Refills: 0 | OUTPATIENT
Start: 2018-08-22 | End: 2018-08-31

## 2018-08-22 RX ORDER — FLUTICASONE PROPIONATE 50 MCG
1 SPRAY, SUSPENSION NASAL
Qty: 1 | Refills: 0 | OUTPATIENT
Start: 2018-08-22 | End: 2018-09-20

## 2018-08-22 RX ORDER — ACETAMINOPHEN 500 MG
2 TABLET ORAL
Qty: 0 | Refills: 0 | COMMUNITY
Start: 2018-08-22

## 2018-08-22 RX ORDER — PANTOPRAZOLE SODIUM 20 MG/1
1 TABLET, DELAYED RELEASE ORAL
Qty: 30 | Refills: 0 | OUTPATIENT
Start: 2018-08-22 | End: 2018-09-20

## 2018-08-22 RX ORDER — IBUPROFEN 200 MG
1 TABLET ORAL
Qty: 0 | Refills: 0 | COMMUNITY
Start: 2018-08-22

## 2018-08-22 RX ADMIN — Medication 250 MILLIGRAM(S): at 06:29

## 2018-08-22 RX ADMIN — Medication 1 SPRAY(S): at 12:39

## 2018-08-22 RX ADMIN — AMPICILLIN SODIUM AND SULBACTAM SODIUM 200 GRAM(S): 250; 125 INJECTION, POWDER, FOR SUSPENSION INTRAMUSCULAR; INTRAVENOUS at 00:13

## 2018-08-22 RX ADMIN — Medication 1 TABLET(S): at 12:40

## 2018-08-22 RX ADMIN — Medication 1 TABLET(S): at 00:13

## 2018-08-22 RX ADMIN — Medication 1 TABLET(S): at 00:43

## 2018-08-22 RX ADMIN — PANTOPRAZOLE SODIUM 40 MILLIGRAM(S): 20 TABLET, DELAYED RELEASE ORAL at 06:29

## 2018-08-22 RX ADMIN — AMPICILLIN SODIUM AND SULBACTAM SODIUM 200 GRAM(S): 250; 125 INJECTION, POWDER, FOR SUSPENSION INTRAMUSCULAR; INTRAVENOUS at 06:29

## 2018-08-22 RX ADMIN — Medication 600 MILLIGRAM(S): at 00:13

## 2018-08-22 RX ADMIN — Medication 600 MILLIGRAM(S): at 00:43

## 2018-08-22 NOTE — PROGRESS NOTE ADULT - SUBJECTIVE AND OBJECTIVE BOX
CC: Left hand pain and swelling (19 Aug 2018 19:24)    HPI: 58 years old male with no known PMH with left hand pain and swelling due to recent cat bite - started to improve with IV abx.     INTERVAL HPI/OVERNIGHT EVENTS: resolved edema, erythema and pain, HA, + chest congestion and dry cough for 1 week  Other ROS reviewed and neg     Vital Signs Last 24 Hrs  T(C): 36.6 (22 Aug 2018 07:36), Max: 36.9 (21 Aug 2018 15:04)  T(F): 97.9 (22 Aug 2018 07:36), Max: 98.4 (21 Aug 2018 15:04)  HR: 71 (22 Aug 2018 07:36) (57 - 71)  BP: 115/76 (22 Aug 2018 07:36) (115/76 - 133/80)  RR: 18 (22 Aug 2018 07:36) (18 - 18)  SpO2: 96% (22 Aug 2018 07:36) (96% - 96%)    MEDICATIONS  (STANDING):  ampicillin/sulbactam  IVPB      ampicillin/sulbactam  IVPB 3 Gram(s) IV Intermittent every 6 hours  enoxaparin Injectable 40 milliGRAM(s) SubCutaneous daily  fluticasone propionate 50 MICROgram(s)/spray Nasal Spray 1 Spray(s) Both Nostrils daily  multivitamin 1 Tablet(s) Oral daily  pantoprazole    Tablet 40 milliGRAM(s) Oral before breakfast  saccharomyces boulardii 250 milliGRAM(s) Oral two times a day    MEDICATIONS  (PRN):  acetaminophen   Tablet 650 milliGRAM(s) Oral every 6 hours PRN For Temp greater than 38 C (100.4 F)  acetaminophen   Tablet. 650 milliGRAM(s) Oral every 6 hours PRN Mild Pain (1 - 3)  acetaminophen 325 mG/butalbital 50 mG/caffeine 40 mG 1 Tablet(s) Oral every 4 hours PRN headache  ALBUTerol    0.083%. 2.5 milliGRAM(s) Nebulizer once PRN Bronchospasm  ibuprofen  Tablet 400 milliGRAM(s) Oral every 6 hours PRN Moderate Pain  ibuprofen  Tablet 600 milliGRAM(s) Oral every 6 hours PRN Severe Pain    RADIOLOGY & ADDITIONAL TESTS: personally visualized    PHYSICAL EXAM:    General: Well developed; well nourished; in no acute distress  Eyes: PERRLA, EOMI; conjunctiva and sclera clear  Head: Normocephalic; atraumatic  ENMT: No nasal discharge; airway clear  Neck: Supple; non tender; no masses  Respiratory: No wheezes, rales or rhonchi, coarse BS  Cardiovascular: Regular rate and rhythm. S1 and S2   Gastrointestinal: Soft non-tender non-distended; Normal bowel sounds  Genitourinary: No costovertebral angle tenderness  Extremities: Normal range of motion, No clubbing, cyanosis or edema  Vascular: Peripheral pulses palpable 2+ bilaterally  Neurological: Alert and oriented x4  Skin: Warm and dry. Left UE demarcated area of erythema and edema involving middle finger with signs of bite completely resolved, able to bend finger  Musculoskeletal: Normal gait, tone, without deformities  Psychiatric: Cooperative and appropriate

## 2018-08-22 NOTE — PROGRESS NOTE ADULT - ATTENDING COMMENTS
No PNA - likely PND vs viral URI  HA - tension - fioricet PRN
No PNA - likely PND with GERD vs viral URI - continue PPI and nasal spray  HA - tension - resolved  Medically stable for DC
Pt seen and examined with PA.  A&P reviewed.  + cough with congestion - add albuterol PRN, nasacort and PPI  F/u repeat BCx and I&S

## 2018-08-22 NOTE — PROGRESS NOTE ADULT - PROBLEM SELECTOR PLAN 1
due to cat bite - requires Pasturella multiocida coverage - agreed with change to IV unasyn and as clinically improved with negative repeat BCx will change to po augmentin for 10 more days with probiotic  + Bacteremia with Pateurella resolved

## 2018-08-22 NOTE — PROGRESS NOTE ADULT - ASSESSMENT
58 years old male with no known PMH comes with left hand pain and swelling. As per patient, 4 days ago he accidently had puncture wound in his left 4th finger from cat's scratch while sleeping. Next day, he noticed some pain/swelling in his finger. He started using Hydrogen Peroxide and Epsom Salt but the pain and swelling are getting worse. He also has noticed redness around the finger for the last two days. Denies fever but was feeling warm today. No purulent discharge from wound but he expressed some serosanguinous fluid.   BLOOD CX WITH GM NEG LUISITO    IDENTIFIED  AS  Pasteurella  REPEAT CX NEGATIVE SIGNIFICANT CLINICAL IMPROVEMENT  OK TO CHANGE TO ORAL AUGMENTIN 875 BID  WOULD COMPLETE TOTAL 2 WEEKS OF ABX

## 2018-08-22 NOTE — PROGRESS NOTE ADULT - SUBJECTIVE AND OBJECTIVE BOX
[facility]  INFECTIOUS DISEASES AND INTERNAL MEDICINE at [place]  =======================================================  Isaiah Arreola MD  Diplomates American Board of Internal Medicine and Infectious Diseases  =======================================================    KRISHNA HENRY [id number]    Follow up LEFT HAND CELLULITIS CAT RELATED    Allergies:  No Known Allergies      Medications:  acetaminophen   Tablet 650 milliGRAM(s) Oral every 6 hours PRN  acetaminophen   Tablet. 650 milliGRAM(s) Oral every 6 hours PRN  acetaminophen 325 mG/butalbital 50 mG/caffeine 40 mG 1 Tablet(s) Oral every 4 hours PRN  ampicillin/sulbactam  IVPB      ampicillin/sulbactam  IVPB 3 Gram(s) IV Intermittent every 6 hours  enoxaparin Injectable 40 milliGRAM(s) SubCutaneous daily  ibuprofen  Tablet 400 milliGRAM(s) Oral every 6 hours PRN  ibuprofen  Tablet 600 milliGRAM(s) Oral every 6 hours PRN  multivitamin 1 Tablet(s) Oral daily  pantoprazole    Tablet 40 milliGRAM(s) Oral before breakfast  saccharomyces boulardii 250 milliGRAM(s) Oral two times a day    SOCIAL       FAMILY   FAMILY HISTORY:  Family history of lung cancer (Father)  Family history of hypertension (Father)    REVIEW OF SYSTEMS:  CONSTITUTIONAL:  No Fever or chills  HEENT:   No diplopia or blurred vision.  No earache, sore throat or runny nose.  CARDIOVASCULAR:  No pressure, squeezing, strangling, tightness, heaviness or aching about the chest, neck, axilla or epigastrium.  RESPIRATORY:  No cough, shortness of breath, PND or orthopnea.  GASTROINTESTINAL:  No nausea, vomiting or diarrhea.  GENITOURINARY:  No dysuria, frequency or urgency. No Blood in urine  MUSCULOSKELETAL:   AS PER HPI     NEUROLOGIC:  No paresthesias, fasciculations, seizures or weakness.  PSYCHIATRIC:  No disorder of thought or mood.  ENDOCRINE:  No heat or cold intolerance, polyuria or polydipsia.  HEMATOLOGICAL:  No easy bruising or bleeding.            Physical Exam:  I Vital Signs Last 24 Hrs  T(C): 36.6 (22 Aug 2018 07:36), Max: 37.2 (21 Aug 2018 09:01)  T(F): 97.9 (22 Aug 2018 07:36), Max: 99 (21 Aug 2018 09:01)  HR: 71 (22 Aug 2018 07:36) (57 - 78)  BP: 115/76 (22 Aug 2018 07:36) (115/76 - 133/80)  BP(mean): --  RR: 18 (22 Aug 2018 07:36) (18 - 18)  SpO2: 96% (22 Aug 2018 07:36) (96% - 97%)    GEN: NAD, pleasant  HEENT: normocephalic and atraumatic. EOMI. JORGE.    NECK: Supple. No carotid bruits.  No lymphadenopathy or thyromegaly.  LUNGS: Clear to auscultation.  HEART: Regular rate and rhythm without murmur.  ABDOMEN: Soft, nontender, and nondistended.  Positive bowel sounds.    : No CVA tenderness  EXTREMITIES: LEFT HAND CELLULITIS DECREASED EDEMA WARMTH AND TENDERNESS  MSK: no joint swelling  NEUROLOGIC: Cranial nerves II through XII are grossly intact.  PSYCHIATRIC: Appropriate affect .  SKIN: No ulceration or induration present.        Labs:                  RECENT CULTURES:  08-20 @ 03:12 .Urine Clean Catch (Midstream)     No growth        08-19 @ 19:00 .Abscess Arm - Left     Culture in progress    Few White blood cells  No organisms seen      08-19 @ 17:28 .Blood Blood-Peripheral Blood Culture PCR    Growth in aerobic bottle: Gram Negative Rods  Aerobic Bottle: 11.54 Hours to positivity  Anaerobic Bottle: No growth to date  .  ***Blood Panel PCR results on this specimen are available  approximately 3 hours after the Gram stain result.***  Gram stain, PCR, and/or culture results may not always  correspond due to difference in methodologies.  ************************************************************  This PCR assay was performed using [employer].  The following targets are tested for: Enterococcus,  vancomycin resistant enterococci, Listeria monocytogenes,  coagulase negative staphylococci, S. aureus,  methicillin resistant S. aureus, Streptococcus agalactiae  (Group B), S. pneumoniae, S. pyogenes (Group A),  Acinetobacter baumannii, Enterobacter cloacae, E. coli,  Klebsiella oxytoca, K. pneumoniae, Proteus sp.,  Serratia marcescens, Haemophilus influenzae,  Neisseria meningitidis, Pseudomonas aeruginosa, Candida  albicans, C. glabrata, C krusei, C parapsilosis,  C. tropicalis and the KPC resistance gene.  "Due to technical problems, Proteus sp. will Not be reported as part of  the BCID panel until further notice"  .  TYPE: (C=Critical, N=Notification, A=Abnormal) C  TESTS:  _ GS  DATE/TIME CALLED: _ 08/20/2018 08:45:08  CALLED TO: Jennifer Osorio RN  READ BACK (2 Patient Identifiers)(Y/N): _ Y  READ BACK VALUES (Y/N): _ Y  CALLED BY: Jennifer Marie

## 2018-08-24 LAB
CULTURE RESULTS: SIGNIFICANT CHANGE UP
CULTURE RESULTS: SIGNIFICANT CHANGE UP
ORGANISM # SPEC MICROSCOPIC CNT: SIGNIFICANT CHANGE UP
ORGANISM # SPEC MICROSCOPIC CNT: SIGNIFICANT CHANGE UP
SPECIMEN SOURCE: SIGNIFICANT CHANGE UP
SPECIMEN SOURCE: SIGNIFICANT CHANGE UP

## 2018-08-25 LAB
CULTURE RESULTS: SIGNIFICANT CHANGE UP
ORGANISM # SPEC MICROSCOPIC CNT: SIGNIFICANT CHANGE UP
ORGANISM # SPEC MICROSCOPIC CNT: SIGNIFICANT CHANGE UP
SPECIMEN SOURCE: SIGNIFICANT CHANGE UP

## 2018-11-06 PROBLEM — J18.9 PNEUMONIA, UNSPECIFIED ORGANISM: Chronic | Status: ACTIVE | Noted: 2018-08-19

## 2018-12-26 ENCOUNTER — APPOINTMENT (OUTPATIENT)
Dept: NEUROLOGY | Facility: CLINIC | Age: 58
End: 2018-12-26

## 2021-01-13 NOTE — ED ADULT TRIAGE NOTE - WEIGHT IN LBS
Davi Santana [address] [place] [address]  Leatha Brady [id number]  Dept: [phone]     Patient is here to establish care. Here for evaluation of the following medical concerns:Establish Care, Anxiety, and Depression    See Bar is a 34 y.o. male who presents to the office today for a first visit and to establish a relationship with a new primary care provider. Pt is here to establish care. He is accompanied by his girlfriend. He discontinued adderall about one month ago due to the side effect of increased anxiety. Since discontinuing it, he has felt increasingly depressed, anxious, and has also experienced dissociation. He says he feels unable to perform his job as a  due to anxiety and feeling so \"not with it\". No suicidal or homicidal ideations. Prior to discontinuing the adderall, he had been taking it for 5 years straight. I do believe many of his symptoms are due to stopping the adderall, but due to the side effect of anxiety I don't think it's wise to restart it at this point. We discussed starting wellbutrin and also discussed doing PRN hydroxyzine in the meantime to help with the anxiety. Will write off work until 21 which is when pt will f/u in office to re assess. Will also get some labwork. Family hx of diabetes in mother. Pt reports episodes of tachycardia where he feels his heart racing. He reports this tends to happen after eating, especially after eating carbohydrates. His mother has DM so he does have concerns of that. Patient Active Problem List    Diagnosis Date Noted    Moderate major depression, single episode ([address] 2021    Anxiety 2021    ADD (attention deficit disorder) 09/15/2016     Patient's BMI is Body mass index is 29.9 kg/m².    Social History     Tobacco Use    Smoking status: Former Smoker     Packs/day: 0.25     Years: 2.00     Pack years: 0.50     Quit date: 2016     Years since quittin.9    Smokeless tobacco: Former User   Substance Use Topics    Alcohol use: No    Drug use: Never      Past Medical History:   Diagnosis Date    ADD (attention deficit disorder) 9/15/2016    Anxiety       PHQ-9 Total Score: 14 (1/13/2021 10:59 AM)  Thoughts that you would be better off dead, or of hurting yourself in some way: 0 (1/13/2021 10:59 AM)     Immunization:  Immunization History   Administered Date(s) Administered    Tdap (Boostrix, Adacel) 06/27/2008    Varicella (Varivax) 06/27/1990, 06/27/1993     Past Surgical History:   Procedure Laterality Date    FRACTURE SURGERY Right     age 10     Family History   Problem Relation Age of Onset    Diabetes Mother     Cancer Sister 28        neck and throat cancer     Current Outpatient Medications   Medication Sig Dispense Refill    buPROPion (WELLBUTRIN XL) 150 MG extended release tablet Take 1 tablet by mouth every morning 30 tablet 0    hydrOXYzine (ATARAX) 25 MG tablet Take 1 tablet by mouth every 8 hours as needed for Anxiety 30 tablet 0     No current facility-administered medications for this visit. The patient's past medical, surgical, social, and family history as well as his current medications and allergies were reviewed as documented in today's encounter. Review of Systems   Constitutional: Positive for fatigue. HENT: Negative. Eyes: Negative. Respiratory: Negative. Cardiovascular: Negative. Racing heart    Gastrointestinal: Negative. Endocrine: Negative. Genitourinary: Negative. Musculoskeletal: Negative. Skin: Negative. Allergic/Immunologic: Negative. Neurological: Negative. Hematological: Negative. Psychiatric/Behavioral: Positive for agitation, decreased concentration and sleep disturbance. The patient is nervous/anxious. All other systems reviewed and are negative.      /70 (Site: Left Upper Arm, Position: Sitting, Cuff Size: Medium Adult)   Pulse 91   Temp 98.7 °F (37.1 °C) (Temporal)   Ht 6' 2\" (1.88 m)   Wt 232 lb 14.4 oz (105.6 kg)   SpO2 96%   BMI 29.90 kg/m²   Physical Exam  Vitals signs and nursing note reviewed. Constitutional:       Appearance: Normal appearance. He is normal weight. HENT:      Right Ear: Tympanic membrane, ear canal and external ear normal.      Left Ear: Tympanic membrane, ear canal and external ear normal.      Nose: Nose normal.      Mouth/Throat:      Mouth: Mucous membranes are moist.      Pharynx: Oropharynx is clear. Eyes:      Extraocular Movements: Extraocular movements intact. Conjunctiva/sclera: Conjunctivae normal.      Pupils: Pupils are equal, round, and reactive to light. Neck:      Musculoskeletal: Normal range of motion. Cardiovascular:      Rate and Rhythm: Normal rate and regular rhythm. Pulses: Normal pulses. Heart sounds: Normal heart sounds. Pulmonary:      Effort: Pulmonary effort is normal.      Breath sounds: Normal breath sounds. Abdominal:      General: Abdomen is flat. Bowel sounds are normal.      Palpations: Abdomen is soft. Skin:     General: Skin is warm and dry. Capillary Refill: Capillary refill takes less than 2 seconds. Neurological:      General: No focal deficit present. Mental Status: He is alert. Psychiatric:         Attention and Perception: Attention normal.         Mood and Affect: Mood is anxious. Affect is tearful. Behavior: Behavior normal. Behavior is cooperative. Thought Content: Thought content normal. Thought content is not delusional. Thought content does not include homicidal or suicidal ideation.          Cognition and Memory: Cognition normal.         Judgment: Judgment normal.       Lab Results   Component Value Date    WBC 11.6 (H) 02/22/2014    HGB 14.6 02/22/2014    HCT 43.1 02/22/2014    MCV 87.7 02/22/2014     02/22/2014     Lab Results   Component Value Date     02/22/2014    K 4.5 02/22/2014     02/22/2014    CO2 29 02/22/2014    BUN 11 02/22/2014    CREATININE 0.99 02/22/2014    GLUCOSE 108 02/22/2014    CALCIUM 9.3 02/22/2014      Lab Results   Component Value Date    ALT 38 02/21/2014    AST 27 02/21/2014    ALKPHOS 64 02/21/2014    BILITOT 0.40 02/21/2014     No results found for: TSHFT4, TSH  No results found for: CHOL  No results found for: TRIG  No results found for: HDL  No results found for: LDLCALC, LDLCHOLESTEROL  No results found for: CHOLHDLRATIO  No results found for: LABA1C  No results found for: FXIOOQUR27  No results found for: FOLATE  No results found for: IRON, TIBC, FERRITIN  No results found for: VITD25  I personally reviewed testing with patient. Otherwise labs within normal limits  ASSESSMENT AND PLAN  1. Positive depression screening  On the basis of positive PHQ-9 screening (PHQ-9 Total Score: 14), the following plan was implemented: additional evaluation and assessment performed, C-SSRS completed and medication prescribed - patient will call for any significant medication side effects or worsening symptoms of depression. Patient will follow-up in 1 week(s) with PCP. 2. Moderate major depression, single episode (HCC)    - Positive Screen for Clinical Depression with a Documented Follow-up Plan   - buPROPion (WELLBUTRIN XL) 150 MG extended release tablet; Take 1 tablet by mouth every morning  Dispense: 30 tablet; Refill: 0  - Comprehensive Metabolic Panel, Fasting; Future  - CBC With Auto Differential; Future  - TSH With Reflex Ft4; Future  - Vitamin D 25 Hydroxy; Future    3. Anxiety    - hydrOXYzine (ATARAX) 25 MG tablet; Take 1 tablet by mouth every 8 hours as needed for Anxiety  Dispense: 30 tablet; Refill: 0  - Comprehensive Metabolic Panel, Fasting; Future  - CBC With Auto Differential; Future  - TSH With Reflex Ft4; Future  - Vitamin D 25 Hydroxy; Future    4. Screening for diabetes mellitus    - Comprehensive Metabolic Panel, Fasting; Future    5.  Screening for hyperlipidemia    - Lipid Panel; Future     Orders Placed This Encounter   Procedures    Lipid Panel     Standing Status:   Future     Standing Expiration Date:   1/13/2022     Order Specific Question:   Is Patient Fasting?/# of Hours     Answer:   10 hours fasting    Comprehensive Metabolic Panel, Fasting     Standing Status:   Future     Standing Expiration Date:   1/13/2022    CBC With Auto Differential     Standing Status:   Future     Standing Expiration Date:   1/13/2022    TSH With Reflex Ft4     Standing Status:   Future     Standing Expiration Date:   1/13/2022    Vitamin D 25 Hydroxy     Standing Status:   Future     Standing Expiration Date:   1/13/2022    Positive Screen for Clinical Depression with a Documented Follow-up Plan      Orders Placed This Encounter   Medications    buPROPion (WELLBUTRIN XL) 150 MG extended release tablet     Sig: Take 1 tablet by mouth every morning     Dispense:  30 tablet     Refill:  0    hydrOXYzine (ATARAX) 25 MG tablet     Sig: Take 1 tablet by mouth every 8 hours as needed for Anxiety     Dispense:  30 tablet     Refill:  0      Data Unavailable  There are no preventive care reminders to display for this patient. Medications Discontinued During This Encounter   Medication Reason    amphetamine-dextroamphetamine (ADDERALL XR) 25 MG extended release capsule Side effects     The patient's past medical, surgical, social, and family history as well as his   current medications and allergies were reviewed as documented in today's encounter. Medications, labs, diagnostic studies, consultations and follow-up as documented in this encounter. Return in about 1 week (around 1/20/2021). Future Appointments   Date Time Provider David Gupta   1/18/2021 10:00 AM Burton Cogan, MD [facility] None     This note was completed by using the assistance of a speech-recognition program. However, inadvertent computerized transcription errors may be present.  Although every effort [phone]

## 2022-06-08 NOTE — H&P ADULT - NSHPOUTPATIENTPROVIDERS_GEN_ALL_CORE
PMD: Dr. Ya Mid-Level Procedure Text (A): After obtaining clear surgical margins the patient was sent to a mid-level provider for surgical repair.  The patient understands they will receive post-surgical care and follow-up from the mid-level provider.

## 2022-12-23 NOTE — CONSULT NOTE ADULT - ATTENDING COMMENTS
I saw and examined the patient in the ER and modified the note as necessary. No need for I&D at this time. IV antibiotics, ID consult, elevation, warm compresses. no
